# Patient Record
Sex: FEMALE | Race: WHITE | Employment: OTHER | URBAN - METROPOLITAN AREA
[De-identification: names, ages, dates, MRNs, and addresses within clinical notes are randomized per-mention and may not be internally consistent; named-entity substitution may affect disease eponyms.]

---

## 2017-01-05 RX ORDER — ATENOLOL 25 MG/1
TABLET ORAL
Qty: 90 TABLET | Refills: 1 | Status: SHIPPED | OUTPATIENT
Start: 2017-01-05 | End: 2017-06-21 | Stop reason: SDUPTHER

## 2017-02-21 RX ORDER — LOSARTAN POTASSIUM AND HYDROCHLOROTHIAZIDE 25; 100 MG/1; MG/1
TABLET ORAL
Qty: 90 TABLET | Refills: 1 | Status: SHIPPED | OUTPATIENT
Start: 2017-02-21 | End: 2017-08-03 | Stop reason: SDUPTHER

## 2017-03-10 ENCOUNTER — NURSE ONLY (OUTPATIENT)
Dept: FAMILY MEDICINE CLINIC | Age: 62
End: 2017-03-10

## 2017-03-10 DIAGNOSIS — S69.92XA HAND INJURY, LEFT, INITIAL ENCOUNTER: Primary | ICD-10-CM

## 2017-03-10 PROCEDURE — 90471 IMMUNIZATION ADMIN: CPT | Performed by: FAMILY MEDICINE

## 2017-03-10 PROCEDURE — 90715 TDAP VACCINE 7 YRS/> IM: CPT | Performed by: FAMILY MEDICINE

## 2017-04-24 ENCOUNTER — OFFICE VISIT (OUTPATIENT)
Dept: FAMILY MEDICINE CLINIC | Age: 62
End: 2017-04-24

## 2017-04-24 VITALS
OXYGEN SATURATION: 96 % | HEIGHT: 62 IN | HEART RATE: 59 BPM | TEMPERATURE: 98 F | BODY MASS INDEX: 30.07 KG/M2 | WEIGHT: 163.38 LBS | DIASTOLIC BLOOD PRESSURE: 74 MMHG | SYSTOLIC BLOOD PRESSURE: 122 MMHG

## 2017-04-24 DIAGNOSIS — R20.8 SKIN PAIN: ICD-10-CM

## 2017-04-24 DIAGNOSIS — B07.9 VIRAL WARTS, UNSPECIFIED TYPE: ICD-10-CM

## 2017-04-24 DIAGNOSIS — M79.10 MYALGIA: ICD-10-CM

## 2017-04-24 DIAGNOSIS — E78.00 PURE HYPERCHOLESTEROLEMIA: ICD-10-CM

## 2017-04-24 DIAGNOSIS — K21.9 GASTROESOPHAGEAL REFLUX DISEASE, ESOPHAGITIS PRESENCE NOT SPECIFIED: ICD-10-CM

## 2017-04-24 DIAGNOSIS — I10 ESSENTIAL HYPERTENSION: Primary | ICD-10-CM

## 2017-04-24 LAB
ALBUMIN SERPL-MCNC: 4.6 G/DL
ALP BLD-CCNC: 79 U/L
ALT SERPL-CCNC: 39 U/L
AST SERPL-CCNC: 33 U/L
BILIRUB SERPL-MCNC: 0.9 MG/DL (ref 0.1–1.4)
BUN BLDV-MCNC: 12 MG/DL
CALCIUM SERPL-MCNC: 10.4 MG/DL
CHLORIDE BLD-SCNC: 103 MMOL/L
CHOLESTEROL, TOTAL: 216 MG/DL
CHOLESTEROL/HDL RATIO: 5
CO2: 29 MMOL/L
CREAT SERPL-MCNC: 0.79 MG/DL
GFR CALCULATED: NORMAL
GLUCOSE BLD-MCNC: 104 MG/DL
HDLC SERPL-MCNC: 43 MG/DL (ref 35–70)
LDL CHOLESTEROL CALCULATED: 142 MG/DL (ref 0–160)
MAGNESIUM: 2.3 MG/DL
POTASSIUM SERPL-SCNC: 4.2 MMOL/L
SODIUM BLD-SCNC: 139 MMOL/L
TOTAL PROTEIN: 7.6
TRIGL SERPL-MCNC: 153 MG/DL
VITAMIN D 25-HYDROXY: 47
VITAMIN D2, 25 HYDROXY: NORMAL
VITAMIN D3,25 HYDROXY: NORMAL
VLDLC SERPL CALC-MCNC: NORMAL MG/DL

## 2017-04-24 PROCEDURE — 99214 OFFICE O/P EST MOD 30 MIN: CPT | Performed by: FAMILY MEDICINE

## 2017-04-24 PROCEDURE — 17110 DESTRUCTION B9 LES UP TO 14: CPT | Performed by: FAMILY MEDICINE

## 2017-04-24 RX ORDER — GREEN TEA/HOODIA GORDONII 315-12.5MG
CAPSULE ORAL
COMMUNITY

## 2017-04-24 RX ORDER — QUINIDINE SULFATE 200 MG
TABLET ORAL
COMMUNITY
End: 2021-11-08

## 2017-04-24 RX ORDER — OMEPRAZOLE 20 MG/1
20 CAPSULE, DELAYED RELEASE ORAL DAILY
Qty: 90 CAPSULE | Refills: 1 | Status: SHIPPED | OUTPATIENT
Start: 2017-04-24 | End: 2017-11-13 | Stop reason: SDUPTHER

## 2017-04-24 ASSESSMENT — PATIENT HEALTH QUESTIONNAIRE - PHQ9
SUM OF ALL RESPONSES TO PHQ9 QUESTIONS 1 & 2: 0
SUM OF ALL RESPONSES TO PHQ QUESTIONS 1-9: 0
2. FEELING DOWN, DEPRESSED OR HOPELESS: 0
1. LITTLE INTEREST OR PLEASURE IN DOING THINGS: 0

## 2017-04-25 LAB
CLIENT CONTACT:: NORMAL
REFERENCE LAB: NORMAL

## 2017-04-27 DIAGNOSIS — E78.00 PURE HYPERCHOLESTEROLEMIA: Primary | ICD-10-CM

## 2017-04-27 DIAGNOSIS — I10 ESSENTIAL HYPERTENSION: ICD-10-CM

## 2017-05-26 ENCOUNTER — OFFICE VISIT (OUTPATIENT)
Dept: FAMILY MEDICINE CLINIC | Age: 62
End: 2017-05-26

## 2017-05-26 VITALS
HEART RATE: 64 BPM | DIASTOLIC BLOOD PRESSURE: 82 MMHG | BODY MASS INDEX: 30.06 KG/M2 | OXYGEN SATURATION: 98 % | WEIGHT: 164.38 LBS | SYSTOLIC BLOOD PRESSURE: 131 MMHG | TEMPERATURE: 97.9 F

## 2017-05-26 DIAGNOSIS — J01.90 ACUTE BACTERIAL SINUSITIS: Primary | ICD-10-CM

## 2017-05-26 DIAGNOSIS — B96.89 ACUTE BACTERIAL SINUSITIS: Primary | ICD-10-CM

## 2017-05-26 PROCEDURE — 99214 OFFICE O/P EST MOD 30 MIN: CPT | Performed by: FAMILY MEDICINE

## 2017-05-26 RX ORDER — AMOXICILLIN 500 MG/1
1000 CAPSULE ORAL 2 TIMES DAILY
Qty: 40 CAPSULE | Refills: 0 | Status: SHIPPED | OUTPATIENT
Start: 2017-05-26 | End: 2017-06-05

## 2017-06-21 RX ORDER — ATENOLOL 25 MG/1
TABLET ORAL
Qty: 90 TABLET | Refills: 1 | Status: SHIPPED | OUTPATIENT
Start: 2017-06-21 | End: 2017-11-13 | Stop reason: SDUPTHER

## 2017-08-03 RX ORDER — LOSARTAN POTASSIUM AND HYDROCHLOROTHIAZIDE 25; 100 MG/1; MG/1
TABLET ORAL
Qty: 90 TABLET | Refills: 1 | Status: SHIPPED | OUTPATIENT
Start: 2017-08-03 | End: 2018-02-07 | Stop reason: SDUPTHER

## 2017-11-13 ENCOUNTER — OFFICE VISIT (OUTPATIENT)
Dept: FAMILY MEDICINE CLINIC | Age: 62
End: 2017-11-13

## 2017-11-13 VITALS
BODY MASS INDEX: 28.94 KG/M2 | WEIGHT: 157.25 LBS | HEIGHT: 62 IN | SYSTOLIC BLOOD PRESSURE: 125 MMHG | HEART RATE: 66 BPM | DIASTOLIC BLOOD PRESSURE: 83 MMHG

## 2017-11-13 DIAGNOSIS — I10 ESSENTIAL HYPERTENSION: ICD-10-CM

## 2017-11-13 DIAGNOSIS — Z11.59 NEED FOR HEPATITIS C SCREENING TEST: ICD-10-CM

## 2017-11-13 DIAGNOSIS — K21.9 GASTROESOPHAGEAL REFLUX DISEASE, ESOPHAGITIS PRESENCE NOT SPECIFIED: ICD-10-CM

## 2017-11-13 DIAGNOSIS — E78.00 PURE HYPERCHOLESTEROLEMIA: Primary | ICD-10-CM

## 2017-11-13 PROCEDURE — 99214 OFFICE O/P EST MOD 30 MIN: CPT | Performed by: FAMILY MEDICINE

## 2017-11-13 RX ORDER — OMEPRAZOLE 20 MG/1
20 CAPSULE, DELAYED RELEASE ORAL DAILY
Qty: 90 CAPSULE | Refills: 1 | Status: SHIPPED | OUTPATIENT
Start: 2017-11-13 | End: 2018-06-20 | Stop reason: SDUPTHER

## 2017-11-13 RX ORDER — ATENOLOL 25 MG/1
TABLET ORAL
Qty: 90 TABLET | Refills: 1 | Status: SHIPPED | OUTPATIENT
Start: 2017-11-13 | End: 2018-03-21

## 2017-11-13 NOTE — PROGRESS NOTES
is normal. Judgment and thought content normal.   Vitals reviewed. Assessment and Plan:  1. Pure hypercholesterolemia  Lipid Panel   2. Essential hypertension  atenolol (TENORMIN) 25 MG tablet    Basic Metabolic Panel   3. Gastroesophageal reflux disease, esophagitis presence not specified  omeprazole (PRILOSEC) 20 MG delayed release capsule   4. Need for hepatitis C screening test  Hepatitis C Antibody   She has had some upper respiratory symptoms but it is all clear drainage so most likely viral or allergy if this changes she will call. The problems listed in the assessment are stable unless otherwise indicated. She  was instructed to continue their current medications and treatment for the above  problems unless otherwise indicated above. Age-specific preventative medicine recommendations were reviewed with patient today and the Healthy Family Handout was given to patient. Avoid tobacco products exposure. Follow up in 6 mo. Call or return to office for any problems that develop before the next scheduled follow-up appointment. Chava Mathews M.D. Parts of this note were completed using voice recognition transcription. Every effort was made to ensure accuracy; however, inadvertent transcription errors may be present.

## 2017-11-15 LAB
BUN / CREAT RATIO: ABNORMAL (CALC) (ref 6–22)
BUN BLDV-MCNC: 12 MG/DL (ref 7–25)
CALCIUM SERPL-MCNC: 10.5 MG/DL
CHLORIDE BLD-SCNC: 102 MMOL/L (ref 98–110)
CHOLESTEROL, TOTAL: 236 MG/DL
CHOLESTEROL/HDL RATIO: 6.1 (CALC)
CHOLESTEROL: 197 MG/DL (CALC)
CO2: 26 MMOL/L (ref 20–31)
CREAT SERPL-MCNC: 0.83 MG/DL
GFR AFRICAN AMERICAN: ABNORMAL ML/MIN/1.73M2
GFR SERPL CREATININE-BSD FRML MDRD: ABNORMAL ML/MIN/1.73M2
GLUCOSE BLD-MCNC: 97 MG/DL (ref 65–99)
HDLC SERPL-MCNC: 39 MG/DL
HEPATITIS C ANTIBODY: NORMAL
HEPATITIS C VIRUS AB SIGNAL/CU: 0.03
LDL CHOLESTEROL CALCULATED: 160 MG/DL (CALC)
POTASSIUM SERPL-SCNC: 4.4 MMOL/L (ref 3.5–5.3)
SODIUM BLD-SCNC: 139 MMOL/L (ref 135–146)
TRIGL SERPL-MCNC: 210 MG/DL

## 2017-11-20 ENCOUNTER — OFFICE VISIT (OUTPATIENT)
Dept: FAMILY MEDICINE CLINIC | Age: 62
End: 2017-11-20

## 2017-11-20 VITALS
BODY MASS INDEX: 29.26 KG/M2 | HEART RATE: 84 BPM | WEIGHT: 160 LBS | SYSTOLIC BLOOD PRESSURE: 120 MMHG | DIASTOLIC BLOOD PRESSURE: 81 MMHG

## 2017-11-20 DIAGNOSIS — B02.9 HERPES ZOSTER WITHOUT COMPLICATION: Primary | ICD-10-CM

## 2017-11-20 LAB
CLIENT CONTACT:: NORMAL
REFERENCE LAB: NORMAL

## 2017-11-20 PROCEDURE — 99213 OFFICE O/P EST LOW 20 MIN: CPT | Performed by: FAMILY MEDICINE

## 2017-11-20 NOTE — PROGRESS NOTES
Subjective:   She presents for rash left upper chest some stinging hurting. She had shingles a few years back and then she got the vaccine after that he states it feels the same way as when she had shingles on her right flank        She is allergic to sudafed [pseudoephedrine hcl]. Objective:   /81 (Site: Left Arm, Position: Sitting, Cuff Size: Large Adult)   Pulse 84   Wt 160 lb (72.6 kg)   BMI 29.26 kg/m²   No results found for this visit on 11/20/17. Exam:  Resolving vesicular rash left upper chest  Assessment and Plan:  1. Herpes zoster without complication       Too late in the course to start on antiviral so just treat symptoms for now and monitor. Call or return to office prn if these symptoms worsen or fail to improve as anticipated. Avoid tobacco products exposure. The Healthy Family Handout was given to the patient today.   Chava Mathews M.D.

## 2017-12-05 ENCOUNTER — HOSPITAL ENCOUNTER (OUTPATIENT)
Dept: MAMMOGRAPHY | Age: 62
Discharge: HOME | End: 2017-12-05
Attending: OBSTETRICS & GYNECOLOGY | Admitting: OBSTETRICS & GYNECOLOGY

## 2017-12-05 DIAGNOSIS — Z12.31 ENCOUNTER FOR SCREENING MAMMOGRAM FOR BREAST CANCER: ICD-10-CM

## 2018-02-07 RX ORDER — LOSARTAN POTASSIUM AND HYDROCHLOROTHIAZIDE 25; 100 MG/1; MG/1
TABLET ORAL
Qty: 90 TABLET | Refills: 1 | Status: SHIPPED | OUTPATIENT
Start: 2018-02-07 | End: 2018-07-29 | Stop reason: SDUPTHER

## 2018-03-21 RX ORDER — ATENOLOL 25 MG/1
TABLET ORAL
Qty: 90 TABLET | Refills: 1 | Status: SHIPPED | OUTPATIENT
Start: 2018-03-21 | End: 2018-09-13 | Stop reason: SDUPTHER

## 2018-06-20 DIAGNOSIS — K21.9 GASTROESOPHAGEAL REFLUX DISEASE, ESOPHAGITIS PRESENCE NOT SPECIFIED: ICD-10-CM

## 2018-06-20 RX ORDER — OMEPRAZOLE 20 MG/1
CAPSULE, DELAYED RELEASE ORAL
Qty: 90 CAPSULE | Refills: 1 | Status: SHIPPED | OUTPATIENT
Start: 2018-06-20 | End: 2020-06-19 | Stop reason: SDUPTHER

## 2018-07-05 ENCOUNTER — OFFICE VISIT (OUTPATIENT)
Dept: FAMILY MEDICINE CLINIC | Age: 63
End: 2018-07-05

## 2018-07-05 VITALS
HEIGHT: 63 IN | SYSTOLIC BLOOD PRESSURE: 129 MMHG | DIASTOLIC BLOOD PRESSURE: 83 MMHG | BODY MASS INDEX: 29.38 KG/M2 | HEART RATE: 64 BPM | TEMPERATURE: 98.3 F | OXYGEN SATURATION: 97 % | WEIGHT: 165.8 LBS

## 2018-07-05 DIAGNOSIS — I10 ESSENTIAL HYPERTENSION: Primary | ICD-10-CM

## 2018-07-05 DIAGNOSIS — Z51.81 MEDICATION MONITORING ENCOUNTER: ICD-10-CM

## 2018-07-05 DIAGNOSIS — H11.31 SCLERAL HEMORRHAGE OF RIGHT EYE: ICD-10-CM

## 2018-07-05 DIAGNOSIS — E78.00 PURE HYPERCHOLESTEROLEMIA: ICD-10-CM

## 2018-07-05 DIAGNOSIS — B02.9 HERPES ZOSTER WITHOUT COMPLICATION: ICD-10-CM

## 2018-07-05 DIAGNOSIS — K21.9 GASTROESOPHAGEAL REFLUX DISEASE, ESOPHAGITIS PRESENCE NOT SPECIFIED: ICD-10-CM

## 2018-07-05 PROCEDURE — 99214 OFFICE O/P EST MOD 30 MIN: CPT | Performed by: FAMILY MEDICINE

## 2018-07-05 RX ORDER — BIMATOPROST 0.3 MG/ML
SOLUTION/ DROPS OPHTHALMIC
Qty: 3 BOTTLE | Refills: 1 | Status: SHIPPED | OUTPATIENT
Start: 2018-07-05 | End: 2019-01-21 | Stop reason: SDUPTHER

## 2018-07-05 RX ORDER — BIMATOPROST 0.3 MG/ML
SOLUTION/ DROPS OPHTHALMIC
Qty: 7.5 ML | Refills: 3 | Status: SHIPPED | OUTPATIENT
Start: 2018-07-05 | End: 2018-07-05 | Stop reason: SDUPTHER

## 2018-07-05 RX ORDER — ACYCLOVIR 800 MG/1
800 TABLET ORAL
Qty: 35 TABLET | Refills: 0 | Status: SHIPPED | OUTPATIENT
Start: 2018-07-05 | End: 2018-07-12

## 2018-07-05 RX ORDER — ACYCLOVIR 800 MG/1
TABLET ORAL
COMMUNITY
Start: 2018-07-04 | End: 2019-01-21 | Stop reason: ALTCHOICE

## 2018-07-05 ASSESSMENT — PATIENT HEALTH QUESTIONNAIRE - PHQ9
SUM OF ALL RESPONSES TO PHQ QUESTIONS 1-9: 0
SUM OF ALL RESPONSES TO PHQ9 QUESTIONS 1 & 2: 0
1. LITTLE INTEREST OR PLEASURE IN DOING THINGS: 0
2. FEELING DOWN, DEPRESSED OR HOPELESS: 0

## 2018-07-07 LAB
ALT SERPL-CCNC: 25 U/L
BUN / CREAT RATIO: NORMAL (CALC) (ref 6–22)
BUN BLDV-MCNC: 9 MG/DL (ref 7–25)
CALCIUM SERPL-MCNC: 9.9 MG/DL
CHLORIDE BLD-SCNC: 101 MMOL/L (ref 98–110)
CHOLESTEROL, TOTAL: 164 MG/DL
CHOLESTEROL/HDL RATIO: 4.4 (CALC)
CHOLESTEROL: 127 MG/DL (CALC)
CO2: 30 MMOL/L (ref 20–31)
CREAT SERPL-MCNC: 0.73 MG/DL
GFR AFRICAN AMERICAN: NORMAL ML/MIN/1.73M2
GFR SERPL CREATININE-BSD FRML MDRD: NORMAL ML/MIN/1.73M2
GLUCOSE BLD-MCNC: 92 MG/DL (ref 65–99)
HDLC SERPL-MCNC: 37 MG/DL
LDL CHOLESTEROL CALCULATED: 102 MG/DL (CALC)
POTASSIUM SERPL-SCNC: 3.9 MMOL/L (ref 3.5–5.3)
SODIUM BLD-SCNC: 138 MMOL/L (ref 135–146)
TRIGL SERPL-MCNC: 146 MG/DL

## 2018-07-30 RX ORDER — LOSARTAN POTASSIUM AND HYDROCHLOROTHIAZIDE 25; 100 MG/1; MG/1
TABLET ORAL
Qty: 90 TABLET | Refills: 1 | Status: SHIPPED | OUTPATIENT
Start: 2018-07-30 | End: 2019-01-22 | Stop reason: SDUPTHER

## 2018-09-13 RX ORDER — ATENOLOL 25 MG/1
TABLET ORAL
Qty: 90 TABLET | Refills: 1 | Status: SHIPPED | OUTPATIENT
Start: 2018-09-13 | End: 2019-03-05 | Stop reason: SDUPTHER

## 2019-01-21 ENCOUNTER — OFFICE VISIT (OUTPATIENT)
Dept: FAMILY MEDICINE CLINIC | Age: 64
End: 2019-01-21
Payer: COMMERCIAL

## 2019-01-21 VITALS
OXYGEN SATURATION: 96 % | TEMPERATURE: 98.1 F | HEART RATE: 62 BPM | BODY MASS INDEX: 29.05 KG/M2 | SYSTOLIC BLOOD PRESSURE: 124 MMHG | DIASTOLIC BLOOD PRESSURE: 82 MMHG | WEIGHT: 164 LBS

## 2019-01-21 DIAGNOSIS — I10 ESSENTIAL HYPERTENSION: Primary | ICD-10-CM

## 2019-01-21 DIAGNOSIS — E78.00 PURE HYPERCHOLESTEROLEMIA: ICD-10-CM

## 2019-01-21 DIAGNOSIS — K21.9 GASTROESOPHAGEAL REFLUX DISEASE, ESOPHAGITIS PRESENCE NOT SPECIFIED: ICD-10-CM

## 2019-01-21 DIAGNOSIS — Z12.39 BREAST CANCER SCREENING: ICD-10-CM

## 2019-01-21 PROCEDURE — 99214 OFFICE O/P EST MOD 30 MIN: CPT | Performed by: FAMILY MEDICINE

## 2019-01-21 RX ORDER — BIMATOPROST 0.3 MG/ML
SOLUTION/ DROPS OPHTHALMIC
Qty: 7.5 ML | Refills: 3 | Status: SHIPPED | OUTPATIENT
Start: 2019-01-21 | End: 2020-10-06 | Stop reason: DRUGHIGH

## 2019-01-22 RX ORDER — LOSARTAN POTASSIUM AND HYDROCHLOROTHIAZIDE 25; 100 MG/1; MG/1
TABLET ORAL
Qty: 90 TABLET | Refills: 1 | Status: SHIPPED | OUTPATIENT
Start: 2019-01-22 | End: 2019-07-20 | Stop reason: SDUPTHER

## 2019-01-23 LAB
CLIENT CONTACT:: NORMAL
REFERENCE LAB: NORMAL

## 2019-01-30 DIAGNOSIS — I10 ESSENTIAL HYPERTENSION: Primary | ICD-10-CM

## 2019-01-30 LAB
BUN BLDV-MCNC: 17 MG/DL
CALCIUM SERPL-MCNC: 9.6 MG/DL
CHLORIDE BLD-SCNC: 105 MMOL/L
CO2: 25 MMOL/L
CREAT SERPL-MCNC: 0.97 MG/DL
GFR CALCULATED: 63
GLUCOSE BLD-MCNC: 92 MG/DL
POTASSIUM SERPL-SCNC: 4.3 MMOL/L
SODIUM BLD-SCNC: 139 MMOL/L

## 2019-02-07 ENCOUNTER — OFFICE VISIT (OUTPATIENT)
Dept: FAMILY MEDICINE CLINIC | Age: 64
End: 2019-02-07
Payer: COMMERCIAL

## 2019-02-07 VITALS
SYSTOLIC BLOOD PRESSURE: 99 MMHG | OXYGEN SATURATION: 97 % | TEMPERATURE: 99.8 F | WEIGHT: 162 LBS | DIASTOLIC BLOOD PRESSURE: 63 MMHG | BODY MASS INDEX: 28.7 KG/M2 | HEART RATE: 86 BPM

## 2019-02-07 DIAGNOSIS — J01.40 ACUTE NON-RECURRENT PANSINUSITIS: Primary | ICD-10-CM

## 2019-02-07 PROCEDURE — 99213 OFFICE O/P EST LOW 20 MIN: CPT | Performed by: NURSE PRACTITIONER

## 2019-02-07 RX ORDER — DOXYCYCLINE HYCLATE 100 MG
100 TABLET ORAL 2 TIMES DAILY
Qty: 20 TABLET | Refills: 0 | Status: SHIPPED | OUTPATIENT
Start: 2019-02-07 | End: 2019-02-17

## 2019-02-07 ASSESSMENT — ENCOUNTER SYMPTOMS
FACIAL SWELLING: 1
COUGH: 1
SINUS PRESSURE: 1
SORE THROAT: 0
GASTROINTESTINAL NEGATIVE: 1
EYES NEGATIVE: 1
SINUS PAIN: 1
SHORTNESS OF BREATH: 0
WHEEZING: 0

## 2019-02-07 ASSESSMENT — PATIENT HEALTH QUESTIONNAIRE - PHQ9
SUM OF ALL RESPONSES TO PHQ QUESTIONS 1-9: 0
1. LITTLE INTEREST OR PLEASURE IN DOING THINGS: 0
SUM OF ALL RESPONSES TO PHQ QUESTIONS 1-9: 0
SUM OF ALL RESPONSES TO PHQ9 QUESTIONS 1 & 2: 0
2. FEELING DOWN, DEPRESSED OR HOPELESS: 0

## 2019-03-05 RX ORDER — ATENOLOL 25 MG/1
TABLET ORAL
Qty: 90 TABLET | Refills: 1 | Status: SHIPPED | OUTPATIENT
Start: 2019-03-05 | End: 2019-08-28 | Stop reason: SDUPTHER

## 2019-07-22 RX ORDER — LOSARTAN POTASSIUM AND HYDROCHLOROTHIAZIDE 25; 100 MG/1; MG/1
TABLET ORAL
Qty: 90 TABLET | Refills: 1 | Status: SHIPPED | OUTPATIENT
Start: 2019-07-22 | End: 2020-01-07

## 2019-08-05 ENCOUNTER — OFFICE VISIT (OUTPATIENT)
Dept: FAMILY MEDICINE CLINIC | Age: 64
End: 2019-08-05
Payer: COMMERCIAL

## 2019-08-05 VITALS
WEIGHT: 163 LBS | SYSTOLIC BLOOD PRESSURE: 113 MMHG | DIASTOLIC BLOOD PRESSURE: 77 MMHG | TEMPERATURE: 98.5 F | HEIGHT: 63 IN | OXYGEN SATURATION: 96 % | HEART RATE: 99 BPM | BODY MASS INDEX: 28.88 KG/M2

## 2019-08-05 DIAGNOSIS — Z51.81 MEDICATION MONITORING ENCOUNTER: ICD-10-CM

## 2019-08-05 DIAGNOSIS — I10 ESSENTIAL HYPERTENSION: Primary | ICD-10-CM

## 2019-08-05 DIAGNOSIS — E78.00 PURE HYPERCHOLESTEROLEMIA: ICD-10-CM

## 2019-08-05 DIAGNOSIS — K21.9 GASTROESOPHAGEAL REFLUX DISEASE, ESOPHAGITIS PRESENCE NOT SPECIFIED: ICD-10-CM

## 2019-08-05 DIAGNOSIS — K58.2 IRRITABLE BOWEL SYNDROME WITH BOTH CONSTIPATION AND DIARRHEA: ICD-10-CM

## 2019-08-05 LAB
ALT SERPL-CCNC: 48 U/L (ref 10–40)
ANION GAP SERPL CALCULATED.3IONS-SCNC: 15 MMOL/L (ref 3–16)
BUN BLDV-MCNC: 10 MG/DL (ref 7–20)
CALCIUM SERPL-MCNC: 10.1 MG/DL (ref 8.3–10.6)
CHLORIDE BLD-SCNC: 99 MMOL/L (ref 99–110)
CHOLESTEROL, TOTAL: 212 MG/DL (ref 0–199)
CO2: 26 MMOL/L (ref 21–32)
CREAT SERPL-MCNC: 0.7 MG/DL (ref 0.6–1.2)
GFR AFRICAN AMERICAN: >60
GFR NON-AFRICAN AMERICAN: >60
GLUCOSE BLD-MCNC: 98 MG/DL (ref 70–99)
HDLC SERPL-MCNC: 44 MG/DL (ref 40–60)
LDL CHOLESTEROL CALCULATED: 132 MG/DL
POTASSIUM SERPL-SCNC: 3.9 MMOL/L (ref 3.5–5.1)
SODIUM BLD-SCNC: 140 MMOL/L (ref 136–145)
TRIGL SERPL-MCNC: 178 MG/DL (ref 0–150)
VLDLC SERPL CALC-MCNC: 36 MG/DL

## 2019-08-05 PROCEDURE — 36415 COLL VENOUS BLD VENIPUNCTURE: CPT | Performed by: FAMILY MEDICINE

## 2019-08-05 PROCEDURE — 99214 OFFICE O/P EST MOD 30 MIN: CPT | Performed by: FAMILY MEDICINE

## 2019-08-05 RX ORDER — DICYCLOMINE HYDROCHLORIDE 10 MG/1
10 CAPSULE ORAL
Qty: 60 CAPSULE | Refills: 3 | Status: SHIPPED | OUTPATIENT
Start: 2019-08-05 | End: 2021-11-16 | Stop reason: SDUPTHER

## 2019-08-05 NOTE — PATIENT INSTRUCTIONS
Please read the healthy family handout that you were given and share it with your family. Please compare this printed medication list with your medications at home to be sure they are the same. If you have any medications that are different please contact us immediately at [phone]. Also review your allergies that we have listed, these may also include medications that you have not been able to tolerate, make sure everything listed is correct. If you have any allergies that are different please contact us immediately at [phone].

## 2019-08-05 NOTE — PROGRESS NOTES
Skin is warm. No cyanosis. Psychiatric: She has a normal mood and affect. Her behavior is normal. Judgment and thought content normal.   Vitals reviewed. Assessment and Plan:   Diagnosis Orders   1. Essential hypertension  Basic Metabolic Panel   2. Pure hypercholesterolemia  Lipid Panel   3. Gastroesophageal reflux disease, esophagitis presence not specified     4. Medication monitoring encounter  ALT   5. Irritable bowel syndrome with both constipation and diarrhea  dicyclomine (BENTYL) 10 MG capsule   try bentyl before going out to eat. The problems listed in the assessment are stable unless otherwise indicated. She  was instructed to continue their current medications and treatment for the aboveproblems unless otherwise indicated above. Age-specific preventative medicine recommendations were reviewed with patient today and the Healthy Family Handout was given to patient. Avoid tobacco products exposure. Follow up in 6mo. Call or return to office for any problems that develop before the next scheduled follow-up appointment. Megan Lopez M.D. Parts of this note were completed using voice recognition transcription. Every effort was made to ensure accuracy; however, inadvertent transcription errors may be present.

## 2019-08-29 RX ORDER — ATENOLOL 25 MG/1
TABLET ORAL
Qty: 90 TABLET | Refills: 1 | Status: SHIPPED | OUTPATIENT
Start: 2019-08-29 | End: 2020-02-26

## 2019-10-23 ENCOUNTER — HOSPITAL ENCOUNTER (OUTPATIENT)
Dept: MAMMOGRAPHY | Age: 64
Discharge: HOME | End: 2019-10-23
Payer: COMMERCIAL

## 2019-10-23 DIAGNOSIS — Z12.39 BREAST CANCER SCREENING: ICD-10-CM

## 2019-10-23 PROCEDURE — 77063 BREAST TOMOSYNTHESIS BI: CPT

## 2019-10-31 ENCOUNTER — TELEPHONE (OUTPATIENT)
Dept: FAMILY MEDICINE CLINIC | Age: 64
End: 2019-10-31

## 2019-11-05 ENCOUNTER — OFFICE VISIT (OUTPATIENT)
Dept: FAMILY MEDICINE CLINIC | Age: 64
End: 2019-11-05
Payer: COMMERCIAL

## 2019-11-05 VITALS
BODY MASS INDEX: 29.41 KG/M2 | SYSTOLIC BLOOD PRESSURE: 120 MMHG | OXYGEN SATURATION: 94 % | TEMPERATURE: 98.6 F | HEART RATE: 66 BPM | DIASTOLIC BLOOD PRESSURE: 79 MMHG | WEIGHT: 166 LBS

## 2019-11-05 DIAGNOSIS — M35.3 POLYMYALGIA (HCC): Primary | ICD-10-CM

## 2019-11-05 LAB
BASOPHILS ABSOLUTE: 0 K/UL (ref 0–0.2)
BASOPHILS RELATIVE PERCENT: 0.7 %
EOSINOPHILS ABSOLUTE: 0.1 K/UL (ref 0–0.6)
EOSINOPHILS RELATIVE PERCENT: 1.6 %
HCT VFR BLD CALC: 41.3 % (ref 36–48)
HEMOGLOBIN: 14 G/DL (ref 12–16)
LYMPHOCYTES ABSOLUTE: 2 K/UL (ref 1–5.1)
LYMPHOCYTES RELATIVE PERCENT: 32.7 %
MCH RBC QN AUTO: 29.7 PG (ref 26–34)
MCHC RBC AUTO-ENTMCNC: 33.9 G/DL (ref 31–36)
MCV RBC AUTO: 87.5 FL (ref 80–100)
MONOCYTES ABSOLUTE: 0.6 K/UL (ref 0–1.3)
MONOCYTES RELATIVE PERCENT: 9.3 %
NEUTROPHILS ABSOLUTE: 3.4 K/UL (ref 1.7–7.7)
NEUTROPHILS RELATIVE PERCENT: 55.7 %
PDW BLD-RTO: 13.3 % (ref 12.4–15.4)
PLATELET # BLD: 206 K/UL (ref 135–450)
PMV BLD AUTO: 9.4 FL (ref 5–10.5)
RBC # BLD: 4.72 M/UL (ref 4–5.2)
WBC # BLD: 6.1 K/UL (ref 4–11)

## 2019-11-05 PROCEDURE — 99214 OFFICE O/P EST MOD 30 MIN: CPT | Performed by: FAMILY MEDICINE

## 2019-11-05 PROCEDURE — 36415 COLL VENOUS BLD VENIPUNCTURE: CPT | Performed by: FAMILY MEDICINE

## 2019-11-06 LAB
A/G RATIO: 1.7 (ref 1.1–2.2)
ALBUMIN SERPL-MCNC: 4.8 G/DL (ref 3.4–5)
ALP BLD-CCNC: 71 U/L (ref 40–129)
ALT SERPL-CCNC: 53 U/L (ref 10–40)
ANION GAP SERPL CALCULATED.3IONS-SCNC: 17 MMOL/L (ref 3–16)
ANTI-NUCLEAR ANTIBODY (ANA): NEGATIVE
AST SERPL-CCNC: 37 U/L (ref 15–37)
BILIRUB SERPL-MCNC: 1 MG/DL (ref 0–1)
BUN BLDV-MCNC: 13 MG/DL (ref 7–20)
C-REACTIVE PROTEIN: 0.5 MG/L (ref 0–5.1)
CALCIUM SERPL-MCNC: 10.5 MG/DL (ref 8.3–10.6)
CHLORIDE BLD-SCNC: 98 MMOL/L (ref 99–110)
CO2: 24 MMOL/L (ref 21–32)
CREAT SERPL-MCNC: 0.7 MG/DL (ref 0.6–1.2)
GFR AFRICAN AMERICAN: >60
GFR NON-AFRICAN AMERICAN: >60
GLOBULIN: 2.8 G/DL
GLUCOSE BLD-MCNC: 96 MG/DL (ref 70–99)
POTASSIUM SERPL-SCNC: 4.4 MMOL/L (ref 3.5–5.1)
SEDIMENTATION RATE, ERYTHROCYTE: 14 MM/HR (ref 0–30)
SODIUM BLD-SCNC: 139 MMOL/L (ref 136–145)
TOTAL CK: 140 U/L (ref 26–192)
TOTAL PROTEIN: 7.6 G/DL (ref 6.4–8.2)
TSH SERPL DL<=0.05 MIU/L-ACNC: 3.38 UIU/ML (ref 0.27–4.2)
VITAMIN D 25-HYDROXY: 30.8 NG/ML

## 2019-11-07 ENCOUNTER — TELEPHONE (OUTPATIENT)
Dept: FAMILY MEDICINE CLINIC | Age: 64
End: 2019-11-07

## 2019-11-07 DIAGNOSIS — M35.3 POLYMYALGIA (HCC): Primary | ICD-10-CM

## 2019-11-07 DIAGNOSIS — M79.606 PAIN OF LOWER EXTREMITY, UNSPECIFIED LATERALITY: ICD-10-CM

## 2019-11-07 DIAGNOSIS — M54.50 ACUTE LOW BACK PAIN, UNSPECIFIED BACK PAIN LATERALITY, UNSPECIFIED WHETHER SCIATICA PRESENT: ICD-10-CM

## 2019-11-25 ENCOUNTER — HOSPITAL ENCOUNTER (OUTPATIENT)
Dept: PHYSICAL THERAPY | Age: 64
Setting detail: THERAPIES SERIES
Discharge: HOME | End: 2019-11-25
Payer: COMMERCIAL

## 2019-11-25 PROCEDURE — 97112 NEUROMUSCULAR REEDUCATION: CPT

## 2019-11-25 PROCEDURE — 97110 THERAPEUTIC EXERCISES: CPT

## 2019-11-25 PROCEDURE — 97161 PT EVAL LOW COMPLEX 20 MIN: CPT

## 2019-11-25 PROCEDURE — 97140 MANUAL THERAPY 1/> REGIONS: CPT

## 2019-11-26 ENCOUNTER — HOSPITAL ENCOUNTER (OUTPATIENT)
Dept: PHYSICAL THERAPY | Age: 64
Setting detail: THERAPIES SERIES
Discharge: HOME | End: 2019-11-26
Payer: COMMERCIAL

## 2019-11-26 PROCEDURE — 97112 NEUROMUSCULAR REEDUCATION: CPT | Performed by: PHYSICAL THERAPY ASSISTANT

## 2019-11-26 PROCEDURE — 97140 MANUAL THERAPY 1/> REGIONS: CPT | Performed by: PHYSICAL THERAPY ASSISTANT

## 2019-11-26 PROCEDURE — 97110 THERAPEUTIC EXERCISES: CPT | Performed by: PHYSICAL THERAPY ASSISTANT

## 2019-12-03 ENCOUNTER — HOSPITAL ENCOUNTER (OUTPATIENT)
Dept: PHYSICAL THERAPY | Age: 64
Setting detail: THERAPIES SERIES
Discharge: HOME | End: 2019-12-03
Payer: COMMERCIAL

## 2019-12-03 PROCEDURE — 97140 MANUAL THERAPY 1/> REGIONS: CPT | Performed by: PHYSICAL THERAPY ASSISTANT

## 2019-12-03 PROCEDURE — 97110 THERAPEUTIC EXERCISES: CPT | Performed by: PHYSICAL THERAPY ASSISTANT

## 2019-12-03 PROCEDURE — 97112 NEUROMUSCULAR REEDUCATION: CPT | Performed by: PHYSICAL THERAPY ASSISTANT

## 2019-12-05 ENCOUNTER — HOSPITAL ENCOUNTER (OUTPATIENT)
Dept: PHYSICAL THERAPY | Age: 64
Setting detail: THERAPIES SERIES
Discharge: HOME | End: 2019-12-05
Payer: COMMERCIAL

## 2019-12-05 PROCEDURE — 97140 MANUAL THERAPY 1/> REGIONS: CPT | Performed by: PHYSICAL THERAPY ASSISTANT

## 2019-12-05 PROCEDURE — 97112 NEUROMUSCULAR REEDUCATION: CPT | Performed by: PHYSICAL THERAPY ASSISTANT

## 2019-12-05 PROCEDURE — 97110 THERAPEUTIC EXERCISES: CPT | Performed by: PHYSICAL THERAPY ASSISTANT

## 2019-12-09 ENCOUNTER — HOSPITAL ENCOUNTER (OUTPATIENT)
Dept: PHYSICAL THERAPY | Age: 64
Setting detail: THERAPIES SERIES
Discharge: HOME | End: 2019-12-09
Payer: COMMERCIAL

## 2019-12-09 PROCEDURE — 97110 THERAPEUTIC EXERCISES: CPT | Performed by: PHYSICAL THERAPY ASSISTANT

## 2019-12-09 PROCEDURE — 97140 MANUAL THERAPY 1/> REGIONS: CPT | Performed by: PHYSICAL THERAPY ASSISTANT

## 2019-12-09 PROCEDURE — 97112 NEUROMUSCULAR REEDUCATION: CPT | Performed by: PHYSICAL THERAPY ASSISTANT

## 2019-12-11 ENCOUNTER — HOSPITAL ENCOUNTER (OUTPATIENT)
Dept: PHYSICAL THERAPY | Age: 64
Setting detail: THERAPIES SERIES
Discharge: HOME | End: 2019-12-11
Payer: COMMERCIAL

## 2019-12-11 PROCEDURE — 97112 NEUROMUSCULAR REEDUCATION: CPT | Performed by: PHYSICAL THERAPY ASSISTANT

## 2019-12-11 PROCEDURE — 97140 MANUAL THERAPY 1/> REGIONS: CPT | Performed by: PHYSICAL THERAPY ASSISTANT

## 2019-12-11 PROCEDURE — 97110 THERAPEUTIC EXERCISES: CPT | Performed by: PHYSICAL THERAPY ASSISTANT

## 2019-12-15 ENCOUNTER — HOSPITAL ENCOUNTER (EMERGENCY)
Age: 64
Discharge: HOME | End: 2019-12-15
Attending: EMERGENCY MEDICINE
Payer: COMMERCIAL

## 2019-12-15 ENCOUNTER — APPOINTMENT (OUTPATIENT)
Dept: CT IMAGING | Age: 64
End: 2019-12-15
Payer: COMMERCIAL

## 2019-12-15 VITALS
TEMPERATURE: 98.2 F | RESPIRATION RATE: 18 BRPM | DIASTOLIC BLOOD PRESSURE: 72 MMHG | SYSTOLIC BLOOD PRESSURE: 139 MMHG | WEIGHT: 160 LBS | OXYGEN SATURATION: 98 % | HEIGHT: 62 IN | BODY MASS INDEX: 29.44 KG/M2 | HEART RATE: 82 BPM

## 2019-12-15 DIAGNOSIS — R10.30 LOWER ABDOMINAL PAIN: ICD-10-CM

## 2019-12-15 DIAGNOSIS — K76.0 HEPATIC STEATOSIS: ICD-10-CM

## 2019-12-15 DIAGNOSIS — N75.0 INFECTED CYST OF BARTHOLIN'S GLAND DUCT: Primary | ICD-10-CM

## 2019-12-15 LAB
A/G RATIO: 1.5 (ref 1.1–2.2)
ALBUMIN SERPL-MCNC: 4.7 G/DL (ref 3.4–5)
ALP BLD-CCNC: 70 U/L (ref 40–129)
ALT SERPL-CCNC: 57 U/L (ref 10–40)
ANION GAP SERPL CALCULATED.3IONS-SCNC: 14 MMOL/L (ref 3–16)
AST SERPL-CCNC: 41 U/L (ref 15–37)
BASOPHILS ABSOLUTE: 0 K/UL (ref 0–0.2)
BASOPHILS RELATIVE PERCENT: 0.6 %
BILIRUB SERPL-MCNC: 0.9 MG/DL (ref 0–1)
BILIRUBIN URINE: NEGATIVE
BLOOD, URINE: NEGATIVE
BUN BLDV-MCNC: 11 MG/DL (ref 7–20)
CALCIUM SERPL-MCNC: 10 MG/DL (ref 8.3–10.6)
CHLORIDE BLD-SCNC: 97 MMOL/L (ref 99–110)
CLARITY: CLEAR
CO2: 26 MMOL/L (ref 21–32)
COLOR: YELLOW
CREAT SERPL-MCNC: 0.6 MG/DL (ref 0.6–1.2)
EOSINOPHILS ABSOLUTE: 0 K/UL (ref 0–0.6)
EOSINOPHILS RELATIVE PERCENT: 0.7 %
GFR AFRICAN AMERICAN: >60
GFR NON-AFRICAN AMERICAN: >60
GLOBULIN: 3.2 G/DL
GLUCOSE BLD-MCNC: 92 MG/DL (ref 70–99)
GLUCOSE URINE: NEGATIVE MG/DL
HCT VFR BLD CALC: 41.4 % (ref 36–48)
HEMOGLOBIN: 14.1 G/DL (ref 12–16)
KETONES, URINE: NEGATIVE MG/DL
LEUKOCYTE ESTERASE, URINE: NEGATIVE
LIPASE: 38 U/L (ref 13–60)
LYMPHOCYTES ABSOLUTE: 1.6 K/UL (ref 1–5.1)
LYMPHOCYTES RELATIVE PERCENT: 25.6 %
MCH RBC QN AUTO: 29.4 PG (ref 26–34)
MCHC RBC AUTO-ENTMCNC: 34.2 G/DL (ref 31–36)
MCV RBC AUTO: 86 FL (ref 80–100)
MICROSCOPIC EXAMINATION: NORMAL
MONOCYTES ABSOLUTE: 0.7 K/UL (ref 0–1.3)
MONOCYTES RELATIVE PERCENT: 11.5 %
NEUTROPHILS ABSOLUTE: 3.8 K/UL (ref 1.7–7.7)
NEUTROPHILS RELATIVE PERCENT: 61.6 %
NITRITE, URINE: NEGATIVE
PDW BLD-RTO: 13.4 % (ref 12.4–15.4)
PH UA: 6.5 (ref 5–8)
PLATELET # BLD: 224 K/UL (ref 135–450)
PMV BLD AUTO: 8.7 FL (ref 5–10.5)
POTASSIUM REFLEX MAGNESIUM: 3.7 MMOL/L (ref 3.5–5.1)
PROTEIN UA: NEGATIVE MG/DL
RBC # BLD: 4.81 M/UL (ref 4–5.2)
SODIUM BLD-SCNC: 137 MMOL/L (ref 136–145)
SPECIFIC GRAVITY UA: 1.01 (ref 1–1.03)
TOTAL PROTEIN: 7.9 G/DL (ref 6.4–8.2)
URINE REFLEX TO CULTURE: NORMAL
URINE TYPE: NORMAL
UROBILINOGEN, URINE: 0.2 E.U./DL
WBC # BLD: 6.2 K/UL (ref 4–11)

## 2019-12-15 PROCEDURE — 83690 ASSAY OF LIPASE: CPT

## 2019-12-15 PROCEDURE — 74177 CT ABD & PELVIS W/CONTRAST: CPT

## 2019-12-15 PROCEDURE — 2580000003 HC RX 258: Performed by: PHYSICIAN ASSISTANT

## 2019-12-15 PROCEDURE — 96361 HYDRATE IV INFUSION ADD-ON: CPT

## 2019-12-15 PROCEDURE — 36415 COLL VENOUS BLD VENIPUNCTURE: CPT

## 2019-12-15 PROCEDURE — 6360000004 HC RX CONTRAST MEDICATION: Performed by: PHYSICIAN ASSISTANT

## 2019-12-15 PROCEDURE — 99283 EMERGENCY DEPT VISIT LOW MDM: CPT

## 2019-12-15 PROCEDURE — 85025 COMPLETE CBC W/AUTO DIFF WBC: CPT

## 2019-12-15 PROCEDURE — 6360000002 HC RX W HCPCS: Performed by: PHYSICIAN ASSISTANT

## 2019-12-15 PROCEDURE — 6370000000 HC RX 637 (ALT 250 FOR IP): Performed by: PHYSICIAN ASSISTANT

## 2019-12-15 PROCEDURE — 80053 COMPREHEN METABOLIC PANEL: CPT

## 2019-12-15 PROCEDURE — 81003 URINALYSIS AUTO W/O SCOPE: CPT

## 2019-12-15 PROCEDURE — 96374 THER/PROPH/DIAG INJ IV PUSH: CPT

## 2019-12-15 PROCEDURE — 4500000023 HC ED LEVEL 3 PROCEDURE

## 2019-12-15 RX ORDER — SULFAMETHOXAZOLE AND TRIMETHOPRIM 800; 160 MG/1; MG/1
1 TABLET ORAL ONCE
Status: COMPLETED | OUTPATIENT
Start: 2019-12-15 | End: 2019-12-15

## 2019-12-15 RX ORDER — CEPHALEXIN 500 MG/1
500 CAPSULE ORAL ONCE
Status: COMPLETED | OUTPATIENT
Start: 2019-12-15 | End: 2019-12-15

## 2019-12-15 RX ORDER — KETOROLAC TROMETHAMINE 30 MG/ML
15 INJECTION, SOLUTION INTRAMUSCULAR; INTRAVENOUS ONCE
Status: COMPLETED | OUTPATIENT
Start: 2019-12-15 | End: 2019-12-15

## 2019-12-15 RX ORDER — CEPHALEXIN 500 MG/1
500 CAPSULE ORAL 4 TIMES DAILY
Qty: 28 CAPSULE | Refills: 0 | Status: SHIPPED | OUTPATIENT
Start: 2019-12-15 | End: 2019-12-22

## 2019-12-15 RX ORDER — SULFAMETHOXAZOLE AND TRIMETHOPRIM 800; 160 MG/1; MG/1
1 TABLET ORAL 2 TIMES DAILY
Qty: 14 TABLET | Refills: 0 | Status: SHIPPED | OUTPATIENT
Start: 2019-12-15 | End: 2019-12-22

## 2019-12-15 RX ORDER — ONDANSETRON 4 MG/1
4 TABLET, FILM COATED ORAL EVERY 8 HOURS PRN
Qty: 10 TABLET | Refills: 0 | Status: SHIPPED | OUTPATIENT
Start: 2019-12-15 | End: 2022-05-09 | Stop reason: ALTCHOICE

## 2019-12-15 RX ORDER — 0.9 % SODIUM CHLORIDE 0.9 %
1000 INTRAVENOUS SOLUTION INTRAVENOUS ONCE
Status: COMPLETED | OUTPATIENT
Start: 2019-12-15 | End: 2019-12-15

## 2019-12-15 RX ADMIN — SODIUM CHLORIDE 1000 ML: 900 INJECTION, SOLUTION INTRAVENOUS at 15:46

## 2019-12-15 RX ADMIN — KETOROLAC TROMETHAMINE 15 MG: 30 INJECTION, SOLUTION INTRAMUSCULAR at 15:46

## 2019-12-15 RX ADMIN — SULFAMETHOXAZOLE AND TRIMETHOPRIM 1 TABLET: 800; 160 TABLET ORAL at 17:17

## 2019-12-15 RX ADMIN — CEPHALEXIN 500 MG: 500 CAPSULE ORAL at 17:17

## 2019-12-15 RX ADMIN — IOPAMIDOL 75 ML: 755 INJECTION, SOLUTION INTRAVENOUS at 16:07

## 2019-12-15 ASSESSMENT — ENCOUNTER SYMPTOMS
ABDOMINAL PAIN: 1
VOMITING: 0
SHORTNESS OF BREATH: 0
DIARRHEA: 0
CONSTIPATION: 0

## 2019-12-15 ASSESSMENT — PAIN SCALES - GENERAL: PAINLEVEL_OUTOF10: 8

## 2019-12-16 ENCOUNTER — HOSPITAL ENCOUNTER (OUTPATIENT)
Dept: PHYSICAL THERAPY | Age: 64
Setting detail: THERAPIES SERIES
Discharge: HOME | End: 2019-12-16
Payer: COMMERCIAL

## 2019-12-18 ENCOUNTER — APPOINTMENT (OUTPATIENT)
Dept: PHYSICAL THERAPY | Age: 64
End: 2019-12-18
Payer: COMMERCIAL

## 2019-12-30 ENCOUNTER — HOSPITAL ENCOUNTER (OUTPATIENT)
Dept: PHYSICAL THERAPY | Age: 64
Setting detail: THERAPIES SERIES
Discharge: HOME | End: 2019-12-30
Payer: COMMERCIAL

## 2019-12-30 PROCEDURE — 97110 THERAPEUTIC EXERCISES: CPT | Performed by: PHYSICAL THERAPY ASSISTANT

## 2019-12-30 PROCEDURE — 97112 NEUROMUSCULAR REEDUCATION: CPT | Performed by: PHYSICAL THERAPY ASSISTANT

## 2019-12-30 PROCEDURE — 97140 MANUAL THERAPY 1/> REGIONS: CPT | Performed by: PHYSICAL THERAPY ASSISTANT

## 2020-01-02 ENCOUNTER — APPOINTMENT (OUTPATIENT)
Dept: PHYSICAL THERAPY | Age: 65
End: 2020-01-02
Payer: COMMERCIAL

## 2020-01-07 RX ORDER — LOSARTAN POTASSIUM AND HYDROCHLOROTHIAZIDE 25; 100 MG/1; MG/1
TABLET ORAL
Qty: 90 TABLET | Refills: 1 | Status: SHIPPED | OUTPATIENT
Start: 2020-01-07 | End: 2020-07-06

## 2020-01-09 ENCOUNTER — HOSPITAL ENCOUNTER (OUTPATIENT)
Dept: PHYSICAL THERAPY | Age: 65
Setting detail: THERAPIES SERIES
Discharge: HOME | End: 2020-01-09
Payer: COMMERCIAL

## 2020-01-09 PROCEDURE — 97140 MANUAL THERAPY 1/> REGIONS: CPT | Performed by: PHYSICAL THERAPY ASSISTANT

## 2020-01-09 PROCEDURE — 97112 NEUROMUSCULAR REEDUCATION: CPT | Performed by: PHYSICAL THERAPY ASSISTANT

## 2020-01-09 PROCEDURE — 97110 THERAPEUTIC EXERCISES: CPT | Performed by: PHYSICAL THERAPY ASSISTANT

## 2020-02-06 ENCOUNTER — HOSPITAL ENCOUNTER (OUTPATIENT)
Dept: PHYSICAL THERAPY | Age: 65
Setting detail: THERAPIES SERIES
Discharge: HOME | End: 2020-02-06
Payer: COMMERCIAL

## 2020-02-06 PROCEDURE — 97110 THERAPEUTIC EXERCISES: CPT | Performed by: PHYSICAL THERAPIST

## 2020-02-06 PROCEDURE — 97112 NEUROMUSCULAR REEDUCATION: CPT | Performed by: PHYSICAL THERAPIST

## 2020-02-06 PROCEDURE — 97140 MANUAL THERAPY 1/> REGIONS: CPT | Performed by: PHYSICAL THERAPIST

## 2020-02-06 NOTE — FLOWSHEET NOTE
formerly [facility], [address] [place] [address] [address], [id number]       Date: 2020          Patient Name; :  Helen Winston; 1955   Dx/ICD Code: Acute Low Back Pain, Polymyalgia, Pain of LE  Treatment Diagnosis: M54.5, M35.3, M79.662, M79.606      Physician: Eric Castellaon        Total PT Visits: 10     Measures Previous Current   Pain (0-10)  Back pain 2/10 \"soreness\"  Leg pain 3/10   Disability %  Mod Oswestry   = 12%   AROM  WNL             Strength     Hip flexion  4+   Hip abduction  4+     Specific Functional Improvements & Impressions:  States she hasn't had left legged symptoms since her last PT visit but is still getting Right sided symptoms down to her lateral knee. Plan & Recommendations:  [x] Continue rehabilitation due to objective improvement and continued functional deficits with frequency and duration: 2x week/ 12 weeks  [] Progress toward  []GAP, []Work Conditioning, []Independent HEP   [x] Discharge due to   [x] Most goals achieved, [] Maximized \"medical necessity\" [] No subjective or objective improvements      Electronically signed by:  Shimon Buckley, [address] [employer] Re-Certification  This patient has been re-evaluated for physical therapy services and for therapy to continue, Medicare, Medicaid and other insurances require periodic physician review of the treatment plan. Please review the above re-evaluation and verify that you agree with plan of care as established above by signing the attached document and return it to our office or note changes to established plan below  [] Follow treatment plan as above [] Discontinue physical therapy  [] Change plan to:                                 __________________________________________________    Physician Signature:____________________________________ Date:____________  By signing above, therapists plan is approved by physician    If you have any questions or concerns, please don't hesitate to call.   Thank you for your referral.    [facility] [address] office  ([phone])     Fax [phone]     Physical Therapy Treatment Note/ Progress Report:     Date:  2020    Patient Name:  Marily Bcekford    :  1955  MRN: [id number]  Restrictions/Precautions:    Medical/Treatment Diagnosis Information:  · Diagnosis:  Acute Low Back Pain, Polymyalgia, Pain of Right leg  · Treatment Diagnosis: M54.5, M35.3, R86.168  Insurance/Certification information:  PT Insurance Information: [employer]  Physician Information:  Referring Practitioner: Susannah Quiroz  Has the plan of care been signed (Y/N):        []  Yes  [x]  No     Date of Patient follow up with Physician:     Is this a Progress Report:     [x]  Yes  []  No      If Yes:  Date Range for reporting period:  Initial Eval: 19    Beginnin20 ----- Ending: 3/6/20    Progress report will be due (10 Rx or 30 days whichever is less): 22    Recertification will be due (POC Duration  / 90 days whichever is less): 20    Visit # Insurance Allowable Auth Required   2019 90      90 []  Yes []  No      Functional Scale: Mod Oswestry: 10%   Date assessed:  19  Functional Scale: Mod Oswestry 7/50 = 14%  Date assessed: 2020  Functional Scale: Mod Oswestry 6/50 = 12%  Date assessed: 2020       Latex Allergy:  [x]NO      []YES  Preferred Language for Healthcare:   [x]English       []other:    Pain level: 3/10      SUBJECTIVE: See progress note above. OBJECTIVE: See eval   Observation:   Right leg showing a leg length discrepancy (right shorter).  2020    Test measurements:      RESTRICTIONS/PRECAUTIONS: ** Right leg radiculopathy  Exercises/Interventions:   Therapeutic Ex (21328)  Therapeutic Activity (34669)  NMR re-education (62917) Sets/Reps Notes/CUES   Bike                         Supine ADD squeeze 10x10\"    Supine abd  x20 Purple clams   Bridge x20    Hook lying crunches x20    SL clams x20    SL SLR x20 Manual Intervention (53246)     Intermittent Manual Traction  5 min Relief with single long leg distraction   Deric stretch 3x30\"    IT band stretch 3x30\"         HS and Piriformis stretch          MET (left leg longer than right) - performed shot gun technique  5 min                   Patient Education 5' Pt ed with HEP and progress of PT tx       Therapeutic Exercise and NMR EXR  [x] (44097) Provided verbal/tactile cueing for activities related to strengthening, flexibility, endurance, ROM for improvements in LE, proximal hip, and core control with self care, mobility, lifting, ambulation. [x] (40769) Provided verbal/tactile cueing for activities related to improving balance, coordination, kinesthetic sense, posture, motor skill, proprioception to assist with LE, proximal hip, and core control in self-care, mobility, lifting, ambulation and eccentric single leg control. NMR and Therapeutic Activities:    [x] (05042 or 21430) Provided verbal/tactile cueing for activities related to improving balance, coordination, kinesthetic sense, posture, motor skill, proprioception and motor activation to allow for proper function of core, proximal hip and LE with self-care and ADLs and functional mobility.    [x] (96973) Gait Re-education- Provided training and instruction to the patient for proper LE, core and proximal hip recruitment and positioning and eccentric body weight control with ambulation re-education including up and down stairs     Home Exercise Program:    [x] (87340) Reviewed/Progressed HEP activities related to strengthening, flexibility, endurance, ROM of core, proximal hip and LE for functional self-care, mobility, lifting and ambulation/stair navigation   [x] (23340) Reviewed/Progressed HEP activities related to improving balance, coordination, kinesthetic sense, posture, motor skill, proprioception of core, proximal hip and LE for self-care, mobility, lifting, and ambulation/stair navigation      Manual Treatments:  PROM / STM / Oscillations-Mobs:  G-I, II, III, IV (PA's, Inf., Post.)  [x] (26063) Provided manual therapy to mobilize LE, proximal hip and/or LS spine soft tissue/joints for the purpose of modulating pain, promoting relaxation, increasing ROM, reducing/eliminating soft tissue swelling/inflammation/restriction, improving soft tissue extensibility and allowing for proper ROM for normal function with self-care, mobility, lifting and ambulation. Modalities:      Charges:  Timed Code Treatment Minutes: 54'   Total Treatment Minutes:  54'   [facility]:  [facility] TIME:  MANUAL TIME:  UNTIMED MINUTES:   -  -  -  -      [] EVAL (LOW) 02746 (typically 20 minutes face-to-face)  [] EVAL (MOD) 06446 (typically 30 minutes face-to-face)  [] EVAL (HIGH) 56942 (typically 45 minutes face-to-face)  [] RE-EVAL     [x] PX(82573) x   2  [] IONTO  [x] NMR (07393) x   1  [] VASO  [x] Manual (21736) x    1 [] Other:  [] TA x      [] Mech Traction (04170)  [] ES(attended) (00194)      [] ES (un) (65731):    ASSESSMENT:  See eval    GOALS:   Short Term Goals: To be achieved in: 2 weeks  1. Independent in HEP and progression per patient tolerance, in order to prevent re-injury. [] Progressing: [x] Met: [] Not Met: [] Adjusted   2. Patient will have a decrease in pain to facilitate improvement in movement, function, and ADLs as indicated by Functional Deficits. [] Progressing: [x] Met: [] Not Met: [] Adjusted     Long Term Goals: To be achieved in: 12 weeks  1. Disability index score of 10% or less for the Mod Oswestry to assist with reaching prior level of function. [x] Progressing: [] Met: [] Not Met: [] Adjusted   2. Patient will demonstrate increased AROM to equal the opposite side bilaterally to allow for proper joint functioning as indicated by patients Functional Deficits. [] Progressing: [x] Met: [] Not Met: [] Adjusted   3.  Patient will demonstrate an increase in strength to of B LE to 4+/5 grossly to allow for proper functional mobility as indicated by patients Functional Deficits. [] Progressing: [x] Met: [] Not Met: [] Adjusted   4. Patient will return to all transfers, work activities, and functional activities without increased symptoms or restriction. [] Progressing: [x] Met: [] Not Met: [] Adjusted   5. Patient will have 0/10 pain with ADL's.  [] Progressing: [x] Met: [] Not Met: [] Adjusted   6. Patient stated goal: to be able to sleep without interruption during sleep  [x] Progressing: [] Met: [] Not Met: [] Adjusted     Overall Progression Towards Functional goals/ Treatment Progress Update:  [x] Patient is progressing as expected towards functional goals listed. [] Progression is slowed due to complexities/Impairments listed. [] Progression has been slowed due to co-morbidities.   [] Plan just implemented, too soon to assess goals progression <30days   [] Goals require adjustment due to lack of progress  [] Patient is not progressing as expected and requires additional follow up with physician  [] Other    Prognosis for POC: [x] Good [] Fair  [] Poor      Patient requires continued skilled intervention: [x] Yes  [] No    Treatment/Activity Tolerance:  [x] Patient able to complete treatment  [] Patient limited by fatigue  [] Patient limited by pain    [] Patient limited by other medical complications  [] Other:     Return to Play: (if applicable)   []  Stage 1: Intro to Strength   []  Stage 2: Return to Run and Strength   []  Stage 3: Return to Jump and Strength   []  Stage 4: Dynamic Strength and Agility   []  Stage 5: Sport Specific Training     []  Ready to Return to Play, Meets All Above Stages   []  Not Ready for Return to Sports   Comments:                         PLAN: DC  [] Continue per plan of care [] Alter current plan (see comments above)  [] Plan of care initiated [] Hold pending MD visit [x] Discharge    Electronically signed by:

## 2020-02-11 ENCOUNTER — HOSPITAL ENCOUNTER (OUTPATIENT)
Dept: PHYSICAL THERAPY | Age: 65
Setting detail: THERAPIES SERIES
Discharge: HOME | End: 2020-02-11
Payer: COMMERCIAL

## 2020-02-11 PROCEDURE — 97112 NEUROMUSCULAR REEDUCATION: CPT | Performed by: PHYSICAL THERAPY ASSISTANT

## 2020-02-11 PROCEDURE — 97110 THERAPEUTIC EXERCISES: CPT | Performed by: PHYSICAL THERAPY ASSISTANT

## 2020-02-11 PROCEDURE — 97140 MANUAL THERAPY 1/> REGIONS: CPT | Performed by: PHYSICAL THERAPY ASSISTANT

## 2020-02-11 NOTE — FLOWSHEET NOTE
Hip ABD with ab set  2x15 R, L ea                   Supine ADD squeeze 10x10\"    Supine abd  x20 Purple clams   PPT 5\" x20    Lumbar Rotations  5\" x20 R, L     Bridge x20    Hook lying crunches x20    SL clams x20     SLR 2x15    Supine Marches  x20                   Supine HS Stretch  3x30\"    Piriformis stretch  3x30\"                             HEP Code  [id number]                           Manual Intervention (00766)     Intermittent Manual Traction  5 min Relief with single long leg distraction   Deric stretch 3x30\"    IT band stretch 3x30\"         HS and Piriformis stretch          MET (left leg longer than right) - performed shot gun technique  5 min                   Patient Education 5' Pt ed with HEP and progress of PT tx       Therapeutic Exercise and NMR EXR  [x] (40747) Provided verbal/tactile cueing for activities related to strengthening, flexibility, endurance, ROM for improvements in LE, proximal hip, and core control with self care, mobility, lifting, ambulation. [x] (93350) Provided verbal/tactile cueing for activities related to improving balance, coordination, kinesthetic sense, posture, motor skill, proprioception to assist with LE, proximal hip, and core control in self-care, mobility, lifting, ambulation and eccentric single leg control. NMR and Therapeutic Activities:    [x] (82669 or 55077) Provided verbal/tactile cueing for activities related to improving balance, coordination, kinesthetic sense, posture, motor skill, proprioception and motor activation to allow for proper function of core, proximal hip and LE with self-care and ADLs and functional mobility.    [x] (01175) Gait Re-education- Provided training and instruction to the patient for proper LE, core and proximal hip recruitment and positioning and eccentric body weight control with ambulation re-education including up and down stairs     Home Exercise Program:    [x] (32396) Reviewed/Progressed HEP activities related to strengthening, flexibility, endurance, ROM of core, proximal hip and LE for functional self-care, mobility, lifting and ambulation/stair navigation   [x] (06968) Reviewed/Progressed HEP activities related to improving balance, coordination, kinesthetic sense, posture, motor skill, proprioception of core, proximal hip and LE for self-care, mobility, lifting, and ambulation/stair navigation      Manual Treatments:  PROM / STM / Oscillations-Mobs:  G-I, II, III, IV (PA's, Inf., Post.)  [x] (68885) Provided manual therapy to mobilize LE, proximal hip and/or LS spine soft tissue/joints for the purpose of modulating pain, promoting relaxation, increasing ROM, reducing/eliminating soft tissue swelling/inflammation/restriction, improving soft tissue extensibility and allowing for proper ROM for normal function with self-care, mobility, lifting and ambulation. Modalities:      Charges:  Timed Code Treatment Minutes: 54'   Total Treatment Minutes:  54'   [facility]:  [facility] TIME:  MANUAL TIME:  UNTIMED MINUTES:   -  -  -  -      [] EVAL (LOW) 76260 (typically 20 minutes face-to-face)  [] EVAL (MOD) 51841 (typically 30 minutes face-to-face)  [] EVAL (HIGH) 52544 (typically 45 minutes face-to-face)  [] RE-EVAL     [x] NQ(44024) x   2  [] IONTO  [x] NMR (84218) x   1  [] VASO  [x] Manual (83447) x    1 [] Other:  [] TA x      [] Mech Traction (16000)  [] ES(attended) (90274)      [] ES (un) (74857):    ASSESSMENT:  See eval    GOALS:   Short Term Goals: To be achieved in: 2 weeks  1. Independent in HEP and progression per patient tolerance, in order to prevent re-injury. [] Progressing: [x] Met: [] Not Met: [] Adjusted   2. Patient will have a decrease in pain to facilitate improvement in movement, function, and ADLs as indicated by Functional Deficits. [] Progressing: [x] Met: [] Not Met: [] Adjusted     Long Term Goals: To be achieved in: 12 weeks  1.  Disability index score of 10% or less for the Mod Oswestry to assist Stage 5: Sport Specific Training     []  Ready to Return to Play, Meets All Above Stages   []  Not Ready for Return to Sports   Comments:                         PLAN:   [x] Continue per plan of care [] Alter current plan (see comments above)  [] Plan of care initiated [] Hold pending MD visit [] Discharge    Electronically signed by:  Manuel Hugo PTA   Note: If patient does not return for scheduled/ recommended follow up visits, this note will serve as a discharge from care along with most recent update on progress.

## 2020-02-12 ENCOUNTER — HOSPITAL ENCOUNTER (OUTPATIENT)
Dept: PHYSICAL THERAPY | Age: 65
Setting detail: THERAPIES SERIES
End: 2020-02-12
Payer: COMMERCIAL

## 2020-02-13 ENCOUNTER — HOSPITAL ENCOUNTER (OUTPATIENT)
Dept: PHYSICAL THERAPY | Age: 65
Setting detail: THERAPIES SERIES
Discharge: HOME | End: 2020-02-13
Payer: COMMERCIAL

## 2020-02-13 PROCEDURE — 97112 NEUROMUSCULAR REEDUCATION: CPT | Performed by: PHYSICAL THERAPIST

## 2020-02-13 PROCEDURE — 97140 MANUAL THERAPY 1/> REGIONS: CPT | Performed by: PHYSICAL THERAPIST

## 2020-02-13 PROCEDURE — 97110 THERAPEUTIC EXERCISES: CPT | Performed by: PHYSICAL THERAPIST

## 2020-02-13 NOTE — FLOWSHEET NOTE
[facility], [address] [place] [address] [address], [facility] Therapy office  ([phone])     Fax [phone]     Physical Therapy Treatment Note/ Progress Report:     Date:  2020    Patient Name:  Priyanka Dobbins    :  1955  MRN: [id number]  Restrictions/Precautions:    Medical/Treatment Diagnosis Information:  · Diagnosis:  Acute Low Back Pain, Polymyalgia, Pain of Right leg  · Treatment Diagnosis: M54.5, M35.3, O56.072  Insurance/Certification information:  PT Insurance Information: Aetna  Physician Information:  Referring Practitioner: Pat Lara  Has the plan of care been signed (Y/N):        []  Yes  [x]  No     Date of Patient follow up with Physician:     Is this a Progress Report:     [x]  Yes  []  No      If Yes:  Date Range for reporting period:  Initial Eval: 19    Beginnin20 ----- Ending: 3/6/20    Progress report will be due (10 Rx or 30 days whichever is less): 06    Recertification will be due (POC Duration  / 90 days whichever is less): 20    Visit # Insurance Allowable Auth Required   3-2020      8 - 2019 90      90 []  Yes []  No      Functional Scale: Mod Oswestry: 10%   Date assessed:  19  Functional Scale: Mod Oswestry 7/50 = 14%  Date assessed: 2020  Functional Scale: Mod Oswestry 6/50 = 12%  Date assessed: 2020       Latex Allergy:  [x]NO      []YES  Preferred Language for Healthcare:   [x]English       []other:    Pain level: 2/10      SUBJECTIVE:   States she's been having some good/bad days lately with intermittent pain down her right leg down to her knee. OBJECTIVE: See eval   Observation:   Right leg showing a leg length discrepancy (right shorter).  2020    Test measurements:      RESTRICTIONS/PRECAUTIONS: ** Right leg radiculopathy  Exercises/Interventions:   Therapeutic Ex (76035)  Therapeutic Activity (17434)  NMR re-education (80574) Sets/Reps Notes/CUES   Bike 5' L5         HR/ Slant Board  X30 Program:    [x] (26479) Reviewed/Progressed HEP activities related to strengthening, flexibility, endurance, ROM of core, proximal hip and LE for functional self-care, mobility, lifting and ambulation/stair navigation   [x] (35489) Reviewed/Progressed HEP activities related to improving balance, coordination, kinesthetic sense, posture, motor skill, proprioception of core, proximal hip and LE for self-care, mobility, lifting, and ambulation/stair navigation      Manual Treatments:  PROM / STM / Oscillations-Mobs:  G-I, II, III, IV (PA's, Inf., Post.)  [x] (63606) Provided manual therapy to mobilize LE, proximal hip and/or LS spine soft tissue/joints for the purpose of modulating pain, promoting relaxation, increasing ROM, reducing/eliminating soft tissue swelling/inflammation/restriction, improving soft tissue extensibility and allowing for proper ROM for normal function with self-care, mobility, lifting and ambulation. Modalities:      Charges:  Timed Code Treatment Minutes: 54'   Total Treatment Minutes:  54'   [facility]:  [facility] TIME:  MANUAL TIME:  UNTIMED MINUTES:   -  -  -  -      [] EVAL (LOW) 78471 (typically 20 minutes face-to-face)  [] EVAL (MOD) 17639 (typically 30 minutes face-to-face)  [] EVAL (HIGH) 03301 (typically 45 minutes face-to-face)  [] RE-EVAL     [x] KY(71085) x   2  [] IONTO  [x] NMR (75788) x   1  [] VASO  [x] Manual (34136) x    1 [] Other:  [] TA x      [] [facility] (59068)  [] ES(attended) (84742)      [] ES (un) (76749):    ASSESSMENT:  See eval    GOALS:   Short Term Goals: To be achieved in: 2 weeks  1. Independent in HEP and progression per patient tolerance, in order to prevent re-injury. [] Progressing: [x] Met: [] Not Met: [] Adjusted   2. Patient will have a decrease in pain to facilitate improvement in movement, function, and ADLs as indicated by Functional Deficits. [] Progressing: [x] Met: [] Not Met: [] Adjusted     Long Term Goals: To be achieved in: 12 weeks  1. Disability index score of 10% or less for the Mod Oswestry to assist with reaching prior level of function. [x] Progressing: [] Met: [] Not Met: [] Adjusted   2. Patient will demonstrate increased AROM to equal the opposite side bilaterally to allow for proper joint functioning as indicated by patients Functional Deficits. [] Progressing: [x] Met: [] Not Met: [] Adjusted   3. Patient will demonstrate an increase in strength to of B LE to 4+/5 grossly to allow for proper functional mobility as indicated by patients Functional Deficits. [] Progressing: [x] Met: [] Not Met: [] Adjusted   4. Patient will return to all transfers, work activities, and functional activities without increased symptoms or restriction. [] Progressing: [x] Met: [] Not Met: [] Adjusted   5. Patient will have 0/10 pain with ADL's.  [] Progressing: [x] Met: [] Not Met: [] Adjusted   6. Patient stated goal: to be able to sleep without interruption during sleep  [x] Progressing: [] Met: [] Not Met: [] Adjusted     Overall Progression Towards Functional goals/ Treatment Progress Update:  [x] Patient is progressing as expected towards functional goals listed. [] Progression is slowed due to complexities/Impairments listed. [] Progression has been slowed due to co-morbidities.   [] Plan just implemented, too soon to assess goals progression <30days   [] Goals require adjustment due to lack of progress  [] Patient is not progressing as expected and requires additional follow up with physician  [] Other    Prognosis for POC: [x] Good [] Fair  [] Poor      Patient requires continued skilled intervention: [x] Yes  [] No    Treatment/Activity Tolerance:  [x] Patient able to complete treatment  [] Patient limited by fatigue  [] Patient limited by pain    [] Patient limited by other medical complications  [] Other:     Return to Play: (if applicable)   []  Stage 1: Intro to Strength   []  Stage 2: Return to Run and Strength   []  Stage 3: Return to Jump and Strength   []  Stage 4: Dynamic Strength and Agility   []  Stage 5: Sport Specific Training     []  Ready to Return to Play, Meets All Above Stages   []  Not Ready for Return to Sports   Comments:                         PLAN:   [x] Continue per plan of care [] Alter current plan (see comments above)  [] Plan of care initiated [] Hold pending MD visit [] Discharge    Electronically signed by:  Amy Madden PT   Note: If patient does not return for scheduled/ recommended follow up visits, this note will serve as a discharge from care along with most recent update on progress.

## 2020-02-18 ENCOUNTER — HOSPITAL ENCOUNTER (OUTPATIENT)
Dept: PHYSICAL THERAPY | Age: 65
Setting detail: THERAPIES SERIES
Discharge: HOME | End: 2020-02-18
Payer: COMMERCIAL

## 2020-02-18 PROCEDURE — 97140 MANUAL THERAPY 1/> REGIONS: CPT | Performed by: PHYSICAL THERAPY ASSISTANT

## 2020-02-18 PROCEDURE — 97110 THERAPEUTIC EXERCISES: CPT | Performed by: PHYSICAL THERAPY ASSISTANT

## 2020-02-18 PROCEDURE — 97112 NEUROMUSCULAR REEDUCATION: CPT | Performed by: PHYSICAL THERAPY ASSISTANT

## 2020-02-18 NOTE — FLOWSHEET NOTE
[facility],  [facility] [address], [address]         Physical Therapy Treatment Note/ Progress Report:     Date:  2020    Patient Name:  Raul Yen    :  1955  MRN: [id number]  Restrictions/Precautions:    Medical/Treatment Diagnosis Information:  · Diagnosis:  Acute Low Back Pain, Polymyalgia, Pain of Right leg  · Treatment Diagnosis: M54.5, M35.3, M88.038  Insurance/Certification information:  PT Insurance Information: Aetna  Physician Information:  Referring Practitioner: Mansi Goddard  Has the plan of care been signed (Y/N):        []  Yes  [x]  No     Date of Patient follow up with Physician:     Is this a Progress Report:     []  Yes  [x]  No      If Yes:  Date Range for reporting period:  Initial Eval: 19    Beginnin20 ----- Ending: 3/6/20    Progress report will be due (10 Rx or 30 days whichever is less): 74    Recertification will be due (POC Duration  / 90 days whichever is less): 20    Visit # Insurance Allowable Auth Required   2019 90      90 []  Yes []  No      Functional Scale: Mod Oswestry: 10%   Date assessed:  19  Functional Scale: Mod Oswestry 7/50 = 14%  Date assessed: 2020  Functional Scale: Mod Oswestry 6/50 = 12%  Date assessed: 2020       Latex Allergy:  [x]NO      []YES  Preferred Language for Healthcare:   [x]English       []other:    Pain level: 2/10      SUBJECTIVE:  Still having trouble sleeping on my right side and having a lot of ache in my right side/belt line. OBJECTIVE: See eval   Observation:   Right leg showing a leg length discrepancy (right shorter).  2020    Test measurements:      RESTRICTIONS/PRECAUTIONS: ** Right leg radiculopathy  Exercises/Interventions:   Therapeutic Ex (69331)  Therapeutic Activity (92106)  NMR re-education (14594) Sets/Reps Notes/CUES   Bike 5' L5         HR/ Slant Board  X30 / 3x30\"    Erlinda Starch with ab set  2x15    HS Curl / Hip ABD with ab set  2x15 R, L related to strengthening, flexibility, endurance, ROM of core, proximal hip and LE for functional self-care, mobility, lifting and ambulation/stair navigation   [x] (20287) Reviewed/Progressed HEP activities related to improving balance, coordination, kinesthetic sense, posture, motor skill, proprioception of core, proximal hip and LE for self-care, mobility, lifting, and ambulation/stair navigation      Manual Treatments:  PROM / STM / Oscillations-Mobs:  G-I, II, III, IV (PA's, Inf., Post.)  [x] (61746) Provided manual therapy to mobilize LE, proximal hip and/or LS spine soft tissue/joints for the purpose of modulating pain, promoting relaxation, increasing ROM, reducing/eliminating soft tissue swelling/inflammation/restriction, improving soft tissue extensibility and allowing for proper ROM for normal function with self-care, mobility, lifting and ambulation. Modalities:      Charges:  Timed Code Treatment Minutes: 54'   Total Treatment Minutes:  54'   150 [facility]:  [facility] TIME:  MANUAL TIME:  UNTIMED MINUTES:   -  -  -  -      [] EVAL (LOW) 40767 (typically 20 minutes face-to-face)  [] EVAL (MOD) 97166 (typically 30 minutes face-to-face)  [] EVAL (HIGH) 77996 (typically 45 minutes face-to-face)  [] RE-EVAL     [x] JK(15755) x   2  [] IONTO  [x] NMR (89894) x   1  [] VASO  [x] Manual (93709) x    1 [] Other:  [] TA x      [] Mech Traction (89374)  [] ES(attended) (74686)      [] ES (un) (73669):    ASSESSMENT:  See eval    GOALS:   Short Term Goals: To be achieved in: 2 weeks  1. Independent in HEP and progression per patient tolerance, in order to prevent re-injury. [] Progressing: [x] Met: [] Not Met: [] Adjusted   2. Patient will have a decrease in pain to facilitate improvement in movement, function, and ADLs as indicated by Functional Deficits. [] Progressing: [x] Met: [] Not Met: [] Adjusted     Long Term Goals: To be achieved in: 12 weeks  1.  Disability index score of 10% or less for the Mod Oswestry to assist with reaching prior level of function. [x] Progressing: [] Met: [] Not Met: [] Adjusted   2. Patient will demonstrate increased AROM to equal the opposite side bilaterally to allow for proper joint functioning as indicated by patients Functional Deficits. [] Progressing: [x] Met: [] Not Met: [] Adjusted   3. Patient will demonstrate an increase in strength to of B LE to 4+/5 grossly to allow for proper functional mobility as indicated by patients Functional Deficits. [] Progressing: [x] Met: [] Not Met: [] Adjusted   4. Patient will return to all transfers, work activities, and functional activities without increased symptoms or restriction. [] Progressing: [x] Met: [] Not Met: [] Adjusted   5. Patient will have 0/10 pain with ADL's.  [] Progressing: [x] Met: [] Not Met: [] Adjusted   6. Patient stated goal: to be able to sleep without interruption during sleep  [x] Progressing: [] Met: [] Not Met: [] Adjusted     Overall Progression Towards Functional goals/ Treatment Progress Update:  [x] Patient is progressing as expected towards functional goals listed. [] Progression is slowed due to complexities/Impairments listed. [] Progression has been slowed due to co-morbidities.   [] Plan just implemented, too soon to assess goals progression <30days   [] Goals require adjustment due to lack of progress  [] Patient is not progressing as expected and requires additional follow up with physician  [] Other    Prognosis for POC: [x] Good [] Fair  [] Poor      Patient requires continued skilled intervention: [x] Yes  [] No    Treatment/Activity Tolerance:  [x] Patient able to complete treatment  [] Patient limited by fatigue  [] Patient limited by pain    [] Patient limited by other medical complications  [] Other:     Return to Play: (if applicable)   []  Stage 1: Intro to Strength   []  Stage 2: Return to Run and Strength   []  Stage 3: Return to Jump and Strength   []  Stage 4: Dynamic Strength and

## 2020-02-20 ENCOUNTER — HOSPITAL ENCOUNTER (OUTPATIENT)
Dept: PHYSICAL THERAPY | Age: 65
Setting detail: THERAPIES SERIES
Discharge: HOME | End: 2020-02-20
Payer: COMMERCIAL

## 2020-02-20 PROCEDURE — 97110 THERAPEUTIC EXERCISES: CPT | Performed by: PHYSICAL THERAPIST

## 2020-02-20 PROCEDURE — 97140 MANUAL THERAPY 1/> REGIONS: CPT | Performed by: PHYSICAL THERAPIST

## 2020-02-20 PROCEDURE — 97112 NEUROMUSCULAR REEDUCATION: CPT | Performed by: PHYSICAL THERAPIST

## 2020-02-20 NOTE — FLOWSHEET NOTE
[facility], [facility],  [facility] [address], [address]         Physical Therapy Treatment Note/ Progress Report:     Date:  2020    Patient Name:  Juliana Faulkner    :  1955  MRN: [id number]  Restrictions/Precautions:    Medical/Treatment Diagnosis Information:  · Diagnosis:  Acute Low Back Pain, Polymyalgia, Pain of Right leg  · Treatment Diagnosis: M54.5, M35.3, I86.117  Insurance/Certification information:  PT Insurance Information: Aetna  Physician Information:  Referring Practitioner: Keila Liu  Has the plan of care been signed (Y/N):        []  Yes  [x]  No     Date of Patient follow up with Physician:     Is this a Progress Report:     []  Yes  [x]  No      If Yes:  Date Range for reporting period:  Initial Eval: 19    Beginnin20 ----- Ending: 3/6/20    Progress report will be due (10 Rx or 30 days whichever is less): 56    Recertification will be due (POC Duration  / 90 days whichever is less): 20    Visit # Insurance Allowable Auth Required   2019 90      90 []  Yes []  No      Functional Scale: Mod Oswestry: 10%   Date assessed:  19  Functional Scale: Mod Oswestry 7/50 = 14%  Date assessed: 2020  Functional Scale: Mod Oswestry 6/50 = 12%  Date assessed: 2020       Latex Allergy:  [x]NO      []YES  Preferred Language for Healthcare:   [x]English       []other:    Pain level: 2/10      SUBJECTIVE:  \"I have some good days and some bad days. .. today has been a good day\"    OBJECTIVE: See eval   Observation:   Right leg showing a leg length discrepancy (right shorter).  2020    Test measurements:      RESTRICTIONS/PRECAUTIONS: ** Right leg radiculopathy  Exercises/Interventions:   Therapeutic Ex (92376)  Therapeutic Activity (83346)  NMR re-education (87250) Sets/Reps Notes/CUES   Bike 5' L5         HR/ Slant Board  X30 / 3x30\"    Earlyne Player with ab set  2x15    HS Curl / Hip ABD with ab set  2x15 R, L ea    Mini squats x20 Sport Specific Training     []  Ready to Return to Play, [employer]   []  Not Ready for Return to Sports   Comments:                         PLAN:   [x] Continue per plan of care [] Alter current plan (see comments above)  [] Plan of care initiated [] Hold pending MD visit [] Discharge    Electronically signed by:  Fran Rangel PT PTA  Note: If patient does not return for scheduled/ recommended follow up visits, this note will serve as a discharge from care along with most recent update on progress.

## 2020-02-25 ENCOUNTER — HOSPITAL ENCOUNTER (OUTPATIENT)
Dept: PHYSICAL THERAPY | Age: 65
Setting detail: THERAPIES SERIES
Discharge: HOME | End: 2020-02-25
Payer: COMMERCIAL

## 2020-02-25 PROCEDURE — 97112 NEUROMUSCULAR REEDUCATION: CPT | Performed by: PHYSICAL THERAPIST

## 2020-02-25 PROCEDURE — 97140 MANUAL THERAPY 1/> REGIONS: CPT | Performed by: PHYSICAL THERAPIST

## 2020-02-25 PROCEDURE — 97110 THERAPEUTIC EXERCISES: CPT | Performed by: PHYSICAL THERAPIST

## 2020-02-25 NOTE — FLOWSHEET NOTE
[facility], [address] [place] [address] [place], [place], [facility]         Physical Therapy Treatment Note/ Progress Report:     Date:  2020    Patient Name:  Derian Henning    :  1955  MRN: [id number]  Restrictions/Precautions:    Medical/Treatment Diagnosis Information:  · Diagnosis:  Acute Low Back Pain, Polymyalgia, Pain of Right leg  · Treatment Diagnosis: M54.5, M35.3, X13.234  Insurance/Certification information:  PT Insurance Information: Aetna  Physician Information:  Referring Practitioner: Khoa Franklin  Has the plan of care been signed (Y/N):        []  Yes  [x]  No     Date of Patient follow up with Physician:     Is this a Progress Report:     []  Yes  [x]  No      If Yes:  Date Range for reporting period:  Initial Eval: 19    Beginnin20 ----- Ending: 3/6/20    Progress report will be due (10 Rx or 30 days whichever is less): 57    Recertification will be due (POC Duration  / 90 days whichever is less): 20    Visit # Insurance Allowable Auth Required   2019 90      90 []  Yes []  No      Functional Scale: Mod Oswestry: 10%   Date assessed:  19  Functional Scale: Mod Oswestry 7/50 = 14%  Date assessed: 2020  Functional Scale: Mod Oswestry 6/50 = 12%  Date assessed: 2020       Latex Allergy:  [x]NO      []YES  Preferred Language for Healthcare:   [x]English       []other:    Pain level: 1/10      SUBJECTIVE:  \"the heel lift I've been wearing that you gave me is really helping me\"    OBJECTIVE: See eval   Observation:   Right leg showing a leg length discrepancy (right shorter).  2020    Test measurements:      RESTRICTIONS/PRECAUTIONS: ** Right leg radiculopathy  Exercises/Interventions:   Therapeutic Ex (23250)  Therapeutic Activity (27146)  NMR re-education (10210) Sets/Reps Notes/CUES   Bike 5' L5         HR/ Slant Board  X30 / 3x30\"    Gladys Ogren with ab set  2x15    HS Curl / Hip ABD with ab set  2x15 R, L ea    Mini squats x20 Supine ADD squeeze 5\" x20    Supine abd  x20 Purple clams   PPT 5\" x20    Lumbar Rotations  5\" x20 R, L     Bridge  x20    Hook lying crunches  x20    SL clams x20 PTB   SLR 3x10 3#   Supine Bicylcle x20 ea 3#   LAQ 3# x20    SAQ 3# 2x15 R, L         Supine HS Stretch  3x30\"    Piriformis stretch  3x30\"                             HEP Code  [id number]                           Manual Intervention (74402)     Intermittent Manual Traction / hook lying as well 7 min Relief with single long leg distraction   Deric stretch 3x30\"    IT band stretch 3x30\"         HS and Piriformis stretch          MET (left leg longer than right) - performed shot gun technique  5 min                   Patient Education 5' Pt ed with HEP and progress of PT tx       Therapeutic Exercise and NMR EXR  [x] (16376) Provided verbal/tactile cueing for activities related to strengthening, flexibility, endurance, ROM for improvements in LE, proximal hip, and core control with self care, mobility, lifting, ambulation. [x] (44265) Provided verbal/tactile cueing for activities related to improving balance, coordination, kinesthetic sense, posture, motor skill, proprioception to assist with LE, proximal hip, and core control in self-care, mobility, lifting, ambulation and eccentric single leg control. NMR and Therapeutic Activities:    [x] (91218 or 33480) Provided verbal/tactile cueing for activities related to improving balance, coordination, kinesthetic sense, posture, motor skill, proprioception and motor activation to allow for proper function of core, proximal hip and LE with self-care and ADLs and functional mobility.    [x] (44881) Gait Re-education- Provided training and instruction to the patient for proper LE, core and proximal hip recruitment and positioning and eccentric body weight control with ambulation re-education including up and down stairs     Home Exercise Program:    [x] (03410) Reviewed/Progressed HEP activities related Agility   []  Stage 5: Sport Specific Training     []  Ready to Return to Play, [employer]   []  Not Ready for Return to Sports   Comments:                         PLAN:   [x] Continue per plan of care [] Alter current plan (see comments above)  [] Plan of care initiated [] Hold pending MD visit [] Discharge    Electronically signed by:  Jefferson Pennington PT  Note: If patient does not return for scheduled/ recommended follow up visits, this note will serve as a discharge from care along with most recent update on progress.

## 2020-02-26 RX ORDER — ATENOLOL 25 MG/1
TABLET ORAL
Qty: 90 TABLET | Refills: 1 | Status: SHIPPED | OUTPATIENT
Start: 2020-02-26 | End: 2020-08-17

## 2020-02-26 NOTE — TELEPHONE ENCOUNTER
Refilled medication per verbal order from provider.   Future appt scheduled 03/02/2020  Last appt 11/05/2019

## 2020-02-27 ENCOUNTER — HOSPITAL ENCOUNTER (OUTPATIENT)
Dept: PHYSICAL THERAPY | Age: 65
Setting detail: THERAPIES SERIES
Discharge: HOME | End: 2020-02-27
Payer: COMMERCIAL

## 2020-02-27 PROCEDURE — 97112 NEUROMUSCULAR REEDUCATION: CPT | Performed by: PHYSICAL THERAPY ASSISTANT

## 2020-02-27 PROCEDURE — 97140 MANUAL THERAPY 1/> REGIONS: CPT | Performed by: PHYSICAL THERAPY ASSISTANT

## 2020-02-27 PROCEDURE — 97110 THERAPEUTIC EXERCISES: CPT | Performed by: PHYSICAL THERAPY ASSISTANT

## 2020-02-27 NOTE — FLOWSHEET NOTE
[facility], [address] [place] [address], [facility]         Physical Therapy Treatment Note/ Progress Report:     Date:  2020    Patient Name:  Marily Beckford    :  1955  MRN: [id number]  Restrictions/Precautions:    Medical/Treatment Diagnosis Information:  · Diagnosis:  Acute Low Back Pain, Polymyalgia, Pain of Right leg  · Treatment Diagnosis: M54.5, M35.3, R24.810  Insurance/Certification information:  PT Insurance Information: Aetna  Physician Information:  Referring Practitioner: Susannah Quiroz  Has the plan of care been signed (Y/N):        []  Yes  [x]  No     Date of Patient follow up with Physician:     Is this a Progress Report:     []  Yes  [x]  No      If Yes:  Date Range for reporting period:  Initial Eval: 19    Beginnin20 ----- Ending: 3/6/20    Progress report will be due (10 Rx or 30 days whichever is less): 64    Recertification will be due (POC Duration  / 90 days whichever is less): 20    Visit # Insurance Allowable Auth Required   2019 90      90 []  Yes []  No      Functional Scale: Mod Oswestry: 10%   Date assessed:  19  Functional Scale: Mod Oswestry 7/50 = 14%  Date assessed: 2020  Functional Scale: Mod Oswestry 6/50 = 12%  Date assessed: 2020       Latex Allergy:  [x]NO      []YES  Preferred Language for Healthcare:   [x]English       []other:    Pain level: 1/10      SUBJECTIVE: \"I do feel better with the heel lift and the ache is not as deep, so I think its getting better\"     OBJECTIVE: See eval   Observation:   Right leg showing a leg length discrepancy (right shorter).  2020    Test measurements:      RESTRICTIONS/PRECAUTIONS: ** Right leg radiculopathy  Exercises/Interventions:   Therapeutic Ex (09728)  Therapeutic Activity (80269)  NMR re-education (24718) Sets/Reps Notes/CUES   Bike 5' L5         HR/ Slant Board  X30 / 3x30\"    Versie Dukes with ab set  2# 2x15    HS Curl / Hip ABD with ab set  2x15 R, L ea 2#   Mini squats x20              Supine ADD squeeze 5\" x20    Supine abd  x20 Purple clams   PPT 5\" x20    Lumbar Rotations  5\" x20 R, L     Bridge  x20    Hook lying crunches  x20    SL clams x30 PTB   SLR 3x10 3#   Supine Bicylcle x20 ea    90/90 position with circles 4# 2x20 ea    LAQ 3# x20    SAQ 3# 2x15 R, L         Supine HS Stretch  3x30\"    Piriformis stretch  3x30\"                             HEP Code  [id number]                           Manual Intervention (13638)     Intermittent Manual Traction / hook lying as well 7 min Relief with single long leg distraction   Deric stretch 3x30\"    IT band stretch 3x30\"         HS and Piriformis stretch          MET (left leg longer than right) - performed shot gun technique  5 min                   Patient Education 5' Pt ed with HEP and progress of PT tx       Therapeutic Exercise and NMR EXR  [x] (01697) Provided verbal/tactile cueing for activities related to strengthening, flexibility, endurance, ROM for improvements in LE, proximal hip, and core control with self care, mobility, lifting, ambulation. [x] (14499) Provided verbal/tactile cueing for activities related to improving balance, coordination, kinesthetic sense, posture, motor skill, proprioception to assist with LE, proximal hip, and core control in self-care, mobility, lifting, ambulation and eccentric single leg control. NMR and Therapeutic Activities:    [x] (57179 or 96041) Provided verbal/tactile cueing for activities related to improving balance, coordination, kinesthetic sense, posture, motor skill, proprioception and motor activation to allow for proper function of core, proximal hip and LE with self-care and ADLs and functional mobility.    [x] (24407) Gait Re-education- Provided training and instruction to the patient for proper LE, core and proximal hip recruitment and positioning and eccentric body weight control with ambulation re-education including up and down stairs     Home Exercise Program:    [x] (25275) Reviewed/Progressed HEP activities related to strengthening, flexibility, endurance, ROM of core, proximal hip and LE for functional self-care, mobility, lifting and ambulation/stair navigation   [x] (42448) Reviewed/Progressed HEP activities related to improving balance, coordination, kinesthetic sense, posture, motor skill, proprioception of core, proximal hip and LE for self-care, mobility, lifting, and ambulation/stair navigation      Manual Treatments:  PROM / STM / Oscillations-Mobs:  G-I, II, III, IV (PA's, Inf., Post.)  [x] (15121) Provided manual therapy to mobilize LE, proximal hip and/or LS spine soft tissue/joints for the purpose of modulating pain, promoting relaxation, increasing ROM, reducing/eliminating soft tissue swelling/inflammation/restriction, improving soft tissue extensibility and allowing for proper ROM for normal function with self-care, mobility, lifting and ambulation. Modalities:      Charges:  Timed Code Treatment Minutes: 54'   Total Treatment Minutes:  54'   150 [facility]:  [facility] TIME:  MANUAL TIME:  UNTIMED MINUTES:   -  -  -  -      [] EVAL (LOW) 39270 (typically 20 minutes face-to-face)  [] EVAL (MOD) 97645 (typically 30 minutes face-to-face)  [] EVAL (HIGH) 89593 (typically 45 minutes face-to-face)  [] RE-EVAL     [x] BL(79529) x   2  [] IONTO  [x] NMR (09868) x   1  [] VASO  [x] Manual (38418) x    1 [] Other:  [] TA x      [] [facility] (37708)  [] ES(attended) (47930)      [] ES (un) (16774):    ASSESSMENT:  See eval    GOALS:   Short Term Goals: To be achieved in: 2 weeks  1. Independent in HEP and progression per patient tolerance, in order to prevent re-injury. [] Progressing: [x] Met: [] Not Met: [] Adjusted   2. Patient will have a decrease in pain to facilitate improvement in movement, function, and ADLs as indicated by Functional Deficits. [] Progressing: [x] Met: [] Not Met: [] Adjusted     Long Term Goals:  To be achieved in: 12

## 2020-03-02 ENCOUNTER — OFFICE VISIT (OUTPATIENT)
Dept: FAMILY MEDICINE CLINIC | Age: 65
End: 2020-03-02
Payer: COMMERCIAL

## 2020-03-02 ENCOUNTER — HOSPITAL ENCOUNTER (OUTPATIENT)
Dept: PHYSICAL THERAPY | Age: 65
Setting detail: THERAPIES SERIES
Discharge: HOME | End: 2020-03-02
Payer: COMMERCIAL

## 2020-03-02 VITALS
OXYGEN SATURATION: 94 % | HEART RATE: 62 BPM | WEIGHT: 167 LBS | TEMPERATURE: 98.3 F | DIASTOLIC BLOOD PRESSURE: 76 MMHG | SYSTOLIC BLOOD PRESSURE: 112 MMHG | BODY MASS INDEX: 30.54 KG/M2

## 2020-03-02 PROCEDURE — 99214 OFFICE O/P EST MOD 30 MIN: CPT | Performed by: FAMILY MEDICINE

## 2020-03-02 PROCEDURE — 97140 MANUAL THERAPY 1/> REGIONS: CPT | Performed by: PHYSICAL THERAPIST

## 2020-03-02 PROCEDURE — 97112 NEUROMUSCULAR REEDUCATION: CPT | Performed by: PHYSICAL THERAPIST

## 2020-03-02 PROCEDURE — 97110 THERAPEUTIC EXERCISES: CPT | Performed by: PHYSICAL THERAPIST

## 2020-03-02 NOTE — FLOWSHEET NOTE
__________________________________________________     Physician Signature:____________________________________ Date:____________  By signing above, therapists plan is approved by physician     If you have any questions or concerns, please don't hesitate to call. Thank you for your referral.     [facility] Therapy office  ([phone])                                      Fax [phone]       Physical Therapy Treatment Note/ Progress Report:     Date:  3/2/2020    Patient Name:  Mitzy Oreilly    :  1955  MRN: [id number]  Restrictions/Precautions:    Medical/Treatment Diagnosis Information:  · Diagnosis:  Acute Low Back Pain, Polymyalgia, Pain of Right leg  · Treatment Diagnosis: M54.5, M35.3, X67.334  Insurance/Certification information:  PT Insurance Information: Aetna  Physician Information:  Referring Practitioner: An Cleary  Has the plan of care been signed (Y/N):        []  Yes  [x]  No     Date of Patient follow up with Physician:     Is this a Progress Report:     []  Yes  [x]  No      If Yes:  Date Range for reporting period:  Initial Eval: 19      Beginning: 3/3/20 ----- Endin/3/20    Progress report will be due (10 Rx or 30 days whichever is less): 29    Recertification will be due (POC Duration  / 90 days whichever is less): 20    Visit # Insurance Allowable Auth Required   2019 90      90 []  Yes []  No      Functional Scale: Mod Oswestry 5/10 = 10%  Date assessed: 19  Functional Scale: Mod Oswestry 7/50 = 14%  Date assessed: 2020  Functional Scale: Mod Oswestry 6/50 = 12%  Date assessed: 2020   Functional Scale: Mod Oswestry 6/50 = 12%   Date assessed: 3/2/2020      Latex Allergy:  [x]NO      []YES  Preferred Language for Healthcare:   [x]English       []other:    Pain level: 1/10      SUBJECTIVE:  See progress note above. OBJECTIVE: See eval   Observation:   Right leg showing a leg length discrepancy (right shorter).  2020    Test function of core, proximal hip and LE with self-care and ADLs and functional mobility. [x] (86068) Gait Re-education- Provided training and instruction to the patient for proper LE, core and proximal hip recruitment and positioning and eccentric body weight control with ambulation re-education including up and down stairs     Home Exercise Program:    [x] (34176) Reviewed/Progressed HEP activities related to strengthening, flexibility, endurance, ROM of core, proximal hip and LE for functional self-care, mobility, lifting and ambulation/stair navigation   [x] (58994) Reviewed/Progressed HEP activities related to improving balance, coordination, kinesthetic sense, posture, motor skill, proprioception of core, proximal hip and LE for self-care, mobility, lifting, and ambulation/stair navigation      Manual Treatments:  PROM / STM / Oscillations-Mobs:  G-I, II, III, IV (PA's, Inf., Post.)  [x] (16345) Provided manual therapy to mobilize LE, proximal hip and/or LS spine soft tissue/joints for the purpose of modulating pain, promoting relaxation, increasing ROM, reducing/eliminating soft tissue swelling/inflammation/restriction, improving soft tissue extensibility and allowing for proper ROM for normal function with self-care, mobility, lifting and ambulation. Modalities:      Charges:  Timed Code Treatment Minutes: 54'   Total Treatment Minutes:  54'   [facility]:  [facility] TIME:  MANUAL TIME:  UNTIMED MINUTES:   -  -  -  -      [] EVAL (LOW) 16087 (typically 20 minutes face-to-face)  [] EVAL (MOD) 20656 (typically 30 minutes face-to-face)  [] EVAL (HIGH) 39478 (typically 45 minutes face-to-face)  [] RE-EVAL     [x] TJ(25320) x   2  [] IONTO  [x] NMR (22145) x   1  [] VASO  [x] Manual (08370) x    1 [] Other:  [] TA x      [] [facility] (20592)  [] ES(attended) (60931)      [] ES (un) (21212):    ASSESSMENT:  See eval    GOALS:   Short Term Goals: To be achieved in: 2 weeks  1.  Independent in HEP and progression intervention: [x] Yes  [] No    Treatment/Activity Tolerance:  [x] Patient able to complete treatment  [] Patient limited by fatigue  [] Patient limited by pain    [] Patient limited by other medical complications  [] Other:     Return to Play: (if applicable)   []  Stage 1: Intro to Strength   []  Stage 2: Return to Run and Strength   []  Stage 3: Return to Jump and Strength   []  Stage 4: Dynamic Strength and Agility   []  Stage 5: Sport Specific Training     []  Ready to Return to Play, Meets All Above Stages   []  Not Ready for Return to Sports   Comments:                         PLAN:   [x] Continue per plan of care [] Alter current plan (see comments above)  [] Plan of care initiated [] Hold pending MD visit [] Discharge    Electronically signed by:  Adele Caballero PT, PTA  Note: If patient does not return for scheduled/ recommended follow up visits, this note will serve as a discharge from care along with most recent update on progress.

## 2020-03-02 NOTE — PATIENT INSTRUCTIONS
Please read the healthy family handout that you were given and share it with your family. Please compare this printed medication list with your medications at home to be sure they are the same. If you have any medications that are different please contact us immediately at [phone]. Also review your allergies that we have listed, these may also include medications that you have not been able to tolerate, make sure everything listed is correct. If you have any allergies that are different please contact us immediately at [phone].

## 2020-03-03 ENCOUNTER — HOSPITAL ENCOUNTER (OUTPATIENT)
Dept: PHYSICAL THERAPY | Age: 65
Setting detail: THERAPIES SERIES
Discharge: HOME | End: 2020-03-03
Payer: COMMERCIAL

## 2020-03-03 LAB
BUN / CREAT RATIO: ABNORMAL (CALC) (ref 6–22)
BUN BLDV-MCNC: 14 MG/DL (ref 7–25)
CALCIUM SERPL-MCNC: 9.9 MG/DL (ref 8.6–10.4)
CHLORIDE BLD-SCNC: 101 MMOL/L (ref 98–110)
CHOLESTEROL, TOTAL: 196 MG/DL
CHOLESTEROL/HDL RATIO: 4.5 (CALC)
CHOLESTEROL: 152 MG/DL (CALC)
CO2: 29 MMOL/L (ref 20–32)
CREAT SERPL-MCNC: 0.86 MG/DL (ref 0.5–0.99)
GFR AFRICAN AMERICAN: 84 ML/MIN/1.73M2
GFR, ESTIMATED: 72 ML/MIN/1.73M2
GLUCOSE BLD-MCNC: 100 MG/DL (ref 65–99)
HDLC SERPL-MCNC: 44 MG/DL
LDL CHOLESTEROL CALCULATED: 125 MG/DL (CALC)
POTASSIUM SERPL-SCNC: 3.8 MMOL/L (ref 3.5–5.3)
SODIUM BLD-SCNC: 137 MMOL/L (ref 135–146)
TRIGL SERPL-MCNC: 159 MG/DL

## 2020-03-03 PROCEDURE — 97140 MANUAL THERAPY 1/> REGIONS: CPT | Performed by: PHYSICAL THERAPY ASSISTANT

## 2020-03-03 PROCEDURE — 97110 THERAPEUTIC EXERCISES: CPT | Performed by: PHYSICAL THERAPY ASSISTANT

## 2020-03-03 PROCEDURE — 97112 NEUROMUSCULAR REEDUCATION: CPT | Performed by: PHYSICAL THERAPY ASSISTANT

## 2020-03-03 NOTE — FLOWSHEET NOTE
[facility], [address] [place] [address], [facility]         Physical Therapy Treatment Note/ Progress Report:     Date:  3/3/2020    Patient Name:  Alondra Garnett    :  1955  MRN: [id number]  Restrictions/Precautions:    Medical/Treatment Diagnosis Information:  · Diagnosis:  Acute Low Back Pain, Polymyalgia, Pain of Right leg  · Treatment Diagnosis: M54.5, M35.3, B93.071  Insurance/Certification information:  PT Insurance Information: Aetna  Physician Information:  Referring Practitioner: Gerry Walden  Has the plan of care been signed (Y/N):        []  Yes  [x]  No     Date of Patient follow up with Physician:     Is this a Progress Report:     []  Yes  [x]  No      If Yes:  Date Range for reporting period:  Initial Eval: 19      Beginning: 3/3/20 ----- Endin/3/20    Progress report will be due (10 Rx or 30 days whichever is less): 57    Recertification will be due (POC Duration  / 90 days whichever is less): 20    Visit # Insurance Allowable Auth Required   2019 90      90 []  Yes []  No      Functional Scale: Mod Oswestry 5/10 = 10%  Date assessed: 19  Functional Scale: Mod Oswestry 7/50 = 14%  Date assessed: 2020  Functional Scale: Mod Oswestry 6/50 = 12%  Date assessed: 2020   Functional Scale: Mod Oswestry 6/50 = 12%   Date assessed: 3/2/2020      Latex Allergy:  [x]NO      []YES  Preferred Language for Healthcare:   [x]English       []other:    Pain level: 1/10      SUBJECTIVE:  Sore from muscle spasms the other day. OBJECTIVE: See eval   Observation:   Right leg showing a leg length discrepancy (right shorter).  2020    Test measurements:      RESTRICTIONS/PRECAUTIONS: ** Right leg radiculopathy  Exercises/Interventions:   Therapeutic Ex (56086)  Therapeutic Activity (28665)  NMR re-education (33804) Sets/Reps Notes/CUES   Bike 5' L5         HR/ Slant Board  X30 / 3x30\"    [facility] with ab set  2# 2x15    HS Curl / Hip ABD with re-education including up and down stairs     Home Exercise Program:    [x] (49701) Reviewed/Progressed HEP activities related to strengthening, flexibility, endurance, ROM of core, proximal hip and LE for functional self-care, mobility, lifting and ambulation/stair navigation   [x] (87224) Reviewed/Progressed HEP activities related to improving balance, coordination, kinesthetic sense, posture, motor skill, proprioception of core, proximal hip and LE for self-care, mobility, lifting, and ambulation/stair navigation      Manual Treatments:  PROM / STM / Oscillations-Mobs:  G-I, II, III, IV (PA's, Inf., Post.)  [x] (33838) Provided manual therapy to mobilize LE, proximal hip and/or LS spine soft tissue/joints for the purpose of modulating pain, promoting relaxation, increasing ROM, reducing/eliminating soft tissue swelling/inflammation/restriction, improving soft tissue extensibility and allowing for proper ROM for normal function with self-care, mobility, lifting and ambulation. Modalities:      Charges:  Timed Code Treatment Minutes: 54'   Total Treatment Minutes:  54'   [facility]:  [facility] TIME:  MANUAL TIME:  UNTIMED MINUTES:   -  -  -  -      [] EVAL (LOW) 48258 (typically 20 minutes face-to-face)  [] EVAL (MOD) 57548 (typically 30 minutes face-to-face)  [] EVAL (HIGH) 15518 (typically 45 minutes face-to-face)  [] RE-EVAL     [x] LV(19720) x   2  [] IONTO  [x] NMR (19576) x   1  [] VASO  [x] Manual (68985) x    1 [] Other:  [] TA x      [] Mech Traction (42232)  [] ES(attended) (37607)      [] ES (un) (16740):    ASSESSMENT:  See eval    GOALS:   Short Term Goals: To be achieved in: 2 weeks  1. Independent in HEP and progression per patient tolerance, in order to prevent re-injury. [] Progressing: [x] Met: [] Not Met: [] Adjusted   2. Patient will have a decrease in pain to facilitate improvement in movement, function, and ADLs as indicated by Functional Deficits.   [] Progressing: [x] Met: [] Not Met: [] Adjusted     Long Term Goals: To be achieved in: 12 weeks  1. Disability index score of 10% or less for the Mod Oswestry to assist with reaching prior level of function. [x] Progressing: [] Met: [] Not Met: [] Adjusted   2. Patient will demonstrate increased AROM to equal the opposite side bilaterally to allow for proper joint functioning as indicated by patients Functional Deficits. [] Progressing: [x] Met: [] Not Met: [] Adjusted   3. Patient will demonstrate an increase in strength to of B LE to 4+/5 grossly to allow for proper functional mobility as indicated by patients Functional Deficits. [] Progressing: [x] Met: [] Not Met: [] Adjusted   4. Patient will return to all transfers, work activities, and functional activities without increased symptoms or restriction. [] Progressing: [x] Met: [] Not Met: [] Adjusted   5. Patient will have 0/10 pain with ADL's.  [] Progressing: [x] Met: [] Not Met: [] Adjusted   6. Patient stated goal: to be able to sleep without interruption during sleep  [x] Progressing: [] Met: [] Not Met: [] Adjusted     Overall Progression Towards Functional goals/ Treatment Progress Update:  [x] Patient is progressing as expected towards functional goals listed. [] Progression is slowed due to complexities/Impairments listed. [] Progression has been slowed due to co-morbidities.   [] Plan just implemented, too soon to assess goals progression <30days   [] Goals require adjustment due to lack of progress  [] Patient is not progressing as expected and requires additional follow up with physician  [] Other    Prognosis for POC: [x] Good [] Fair  [] Poor      Patient requires continued skilled intervention: [x] Yes  [] No    Treatment/Activity Tolerance:  [x] Patient able to complete treatment  [] Patient limited by fatigue  [] Patient limited by pain    [] Patient limited by other medical complications  [] Other:     Return to Play: (if applicable)   []  Stage 1: Intro to Strength   []  Stage 2: Return to Run and Strength   []  Stage 3: Return to Jump and Strength   []  Stage 4: Dynamic Strength and Agility   []  Stage 5: Sport Specific Training     []  Ready to Return to Play, Meets All Above Stages   []  Not Ready for Return to Sports   Comments:                         PLAN:   [x] Continue per plan of care [] Alter current plan (see comments above)  [] Plan of care initiated [] Hold pending MD visit [] Discharge    Electronically signed by:  Christos Tavera PTA  Note: If patient does not return for scheduled/ recommended follow up visits, this note will serve as a discharge from care along with most recent update on progress.

## 2020-03-10 ENCOUNTER — HOSPITAL ENCOUNTER (OUTPATIENT)
Dept: PHYSICAL THERAPY | Age: 65
Setting detail: THERAPIES SERIES
Discharge: HOME | End: 2020-03-10
Payer: COMMERCIAL

## 2020-03-10 PROCEDURE — 97110 THERAPEUTIC EXERCISES: CPT | Performed by: PHYSICAL THERAPY ASSISTANT

## 2020-03-10 PROCEDURE — 97140 MANUAL THERAPY 1/> REGIONS: CPT | Performed by: PHYSICAL THERAPY ASSISTANT

## 2020-03-10 PROCEDURE — 97112 NEUROMUSCULAR REEDUCATION: CPT | Performed by: PHYSICAL THERAPY ASSISTANT

## 2020-03-10 NOTE — FLOWSHEET NOTE
[facility], [address] [place] [address], [id number]         Physical Therapy Treatment Note/ Progress Report:     Date:  3/10/2020    Patient Name:  Garcia Beasley    :  1955  MRN: [id number]  Restrictions/Precautions:    Medical/Treatment Diagnosis Information:  · Diagnosis:  Acute Low Back Pain, Polymyalgia, Pain of Right leg  · Treatment Diagnosis: M54.5, M35.3, H14.383  Insurance/Certification information:  PT Insurance Information: Aetna  Physician Information:  Referring Practitioner: Corinne Massed  Has the plan of care been signed (Y/N):        []  Yes  [x]  No     Date of Patient follow up with Physician:     Is this a Progress Report:     []  Yes  [x]  No      If Yes:  Date Range for reporting period:  Initial Eval: 19      Beginning: 3/3/20 ----- Endin/3/20    Progress report will be due (10 Rx or 30 days whichever is less):     Recertification will be due (POC Duration  / 90 days whichever is less): 20    Visit # Insurance Allowable Auth Required   2019 90      90 []  Yes []  No      Functional Scale: Mod Oswestry 5/10 = 10%  Date assessed: 19  Functional Scale: Mod Oswestry 7/50 = 14%  Date assessed: 2020  Functional Scale: Mod Oswestry 6/50 = 12%  Date assessed: 2020   Functional Scale: Mod Oswestry 6/50 = 12%   Date assessed: 3/2/2020      Latex Allergy:  [x]NO      []YES  Preferred Language for Healthcare:   [x]English       []other:    Pain level: 1/10      SUBJECTIVE:  \"I am pretty pleased with progress this time. I feel like I can try my HEP independently for a few weeks and then see     OBJECTIVE: See eval   Observation:   Right leg showing a leg length discrepancy (right shorter).  2020    Test measurements:      RESTRICTIONS/PRECAUTIONS: ** Right leg radiculopathy  Exercises/Interventions:   Therapeutic Ex (00782)  Therapeutic Activity (93681)  NMR re-education (07181) Sets/Reps Notes/CUES   Bike 5' L5         HR/ Slant Board  X30 / 3x30\"    Rulon Masters with ab set  2# 2x15    HS Curl / Hip ABD with ab set  2x15 R, L ea 2#   Mini squats x20    Leg Press 80# x30 DL         Supine ADD squeeze 5\" x20    Supine abd  x20 Purple clams   PPT 5\" x20    Lumbar Rotations  5\" x20 R, L     Bridge  x20    Hook lying crunches  x20    SL clams x30 PTB   SLR 3x10 3#   Supine Bicylcle x20 ea    90/90 position with circles 4# 2x20 ea    LAQ 3# x20    SAQ 3# 2x15 R, L         Supine HS Stretch  3x30\"    Piriformis stretch  3x30\"                             HEP Code  [id number]                           Manual Intervention (02329)     Intermittent Manual Traction / hook lying as well 7 min Relief with single long leg distraction   Deric stretch  (some back pain)   IT band stretch 3x30\"         HS and Piriformis stretch          MET (left leg longer than right) - performed shot gun technique  5 min                   Patient Education 5' Pt ed with HEP and progress of PT tx       Therapeutic Exercise and NMR EXR  [x] (15953) Provided verbal/tactile cueing for activities related to strengthening, flexibility, endurance, ROM for improvements in LE, proximal hip, and core control with self care, mobility, lifting, ambulation. [x] (01483) Provided verbal/tactile cueing for activities related to improving balance, coordination, kinesthetic sense, posture, motor skill, proprioception to assist with LE, proximal hip, and core control in self-care, mobility, lifting, ambulation and eccentric single leg control. NMR and Therapeutic Activities:    [x] (12630 or 67628) Provided verbal/tactile cueing for activities related to improving balance, coordination, kinesthetic sense, posture, motor skill, proprioception and motor activation to allow for proper function of core, proximal hip and LE with self-care and ADLs and functional mobility.    [x] (35159) Gait Re-education- Provided training and instruction to the patient for proper LE, core and proximal hip

## 2020-03-19 ENCOUNTER — HOSPITAL ENCOUNTER (OUTPATIENT)
Dept: PHYSICAL THERAPY | Age: 65
Setting detail: THERAPIES SERIES
End: 2020-03-19
Payer: COMMERCIAL

## 2020-06-19 RX ORDER — OMEPRAZOLE 20 MG/1
CAPSULE, DELAYED RELEASE ORAL
Qty: 90 CAPSULE | Refills: 1 | Status: SHIPPED | OUTPATIENT
Start: 2020-06-19 | End: 2021-11-08 | Stop reason: ALTCHOICE

## 2020-07-06 RX ORDER — LOSARTAN POTASSIUM AND HYDROCHLOROTHIAZIDE 25; 100 MG/1; MG/1
TABLET ORAL
Qty: 90 TABLET | Refills: 1 | Status: SHIPPED | OUTPATIENT
Start: 2020-07-06 | End: 2020-12-23

## 2020-08-17 RX ORDER — ATENOLOL 25 MG/1
TABLET ORAL
Qty: 90 TABLET | Refills: 1 | Status: SHIPPED | OUTPATIENT
Start: 2020-08-17 | End: 2021-02-08

## 2020-10-06 ENCOUNTER — OFFICE VISIT (OUTPATIENT)
Dept: FAMILY MEDICINE CLINIC | Age: 65
End: 2020-10-06
Payer: COMMERCIAL

## 2020-10-06 VITALS
SYSTOLIC BLOOD PRESSURE: 131 MMHG | HEART RATE: 69 BPM | BODY MASS INDEX: 30.66 KG/M2 | DIASTOLIC BLOOD PRESSURE: 81 MMHG | WEIGHT: 166.6 LBS | TEMPERATURE: 98.6 F | HEIGHT: 62 IN | OXYGEN SATURATION: 97 %

## 2020-10-06 PROCEDURE — 99214 OFFICE O/P EST MOD 30 MIN: CPT | Performed by: FAMILY MEDICINE

## 2020-10-06 PROCEDURE — 36415 COLL VENOUS BLD VENIPUNCTURE: CPT | Performed by: FAMILY MEDICINE

## 2020-10-06 RX ORDER — BIMATOPROST 0.3 MG/ML
SOLUTION/ DROPS OPHTHALMIC
Qty: 7.5 ML | Refills: 3 | Status: SHIPPED | OUTPATIENT
Start: 2020-10-06 | End: 2020-10-26 | Stop reason: SDUPTHER

## 2020-10-06 RX ORDER — DOXYCYCLINE HYCLATE 100 MG/1
100 CAPSULE ORAL DAILY
Qty: 90 CAPSULE | Refills: 0 | Status: SHIPPED | OUTPATIENT
Start: 2020-10-06 | End: 2022-05-09 | Stop reason: SDUPTHER

## 2020-10-06 ASSESSMENT — PATIENT HEALTH QUESTIONNAIRE - PHQ9
1. LITTLE INTEREST OR PLEASURE IN DOING THINGS: 0
2. FEELING DOWN, DEPRESSED OR HOPELESS: 0
SUM OF ALL RESPONSES TO PHQ QUESTIONS 1-9: 0
SUM OF ALL RESPONSES TO PHQ QUESTIONS 1-9: 0
SUM OF ALL RESPONSES TO PHQ9 QUESTIONS 1 & 2: 0

## 2020-10-06 NOTE — PROGRESS NOTES
Subjective:   She presents for follow up of her high blood pressure high cholesterol and IBS she is also having flareup of her acne. Few years ago dermatology had her on doxycycline 1 a day at bedtime for about 3 months and it worked for a long time she would like to try that again. She states her blood pressures have been good. She still on a low-cholesterol diet. She is still using Bentyl as needed for her IBS and it helps        She is allergic to sudafed [pseudoephedrine hcl]. She   reports that she has never smoked. She has never used smokeless tobacco. Review of systems negative for chest pain swelling shortness of breath wheezing abdominal pain rectal bleeding  Objective:   /81   Pulse 69   Temp 98.6 °F (37 °C) (Oral)   Ht 5' 2\" (1.575 m)   Wt 166 lb 9.6 oz (75.6 kg)   SpO2 97%   BMI 30.47 kg/m²   BP Readings from Last 3 Encounters:   10/06/20 131/81   03/02/20 112/76   12/15/19 139/72     Physical Exam  Vitals signs reviewed. Constitutional:       Appearance: She is well-developed. She is not toxic-appearing. HENT:      Head: Normocephalic. Eyes:      General: No scleral icterus. Right eye: No discharge. Left eye: No discharge. Conjunctiva/sclera: Conjunctivae normal.   Neck:      Musculoskeletal: Neck supple. Thyroid: No thyroid mass or thyromegaly. Vascular: No carotid bruit. Cardiovascular:      Rate and Rhythm: Normal rate and regular rhythm. Heart sounds: Normal heart sounds. No murmur. Pulmonary:      Effort: No respiratory distress. Breath sounds: Normal breath sounds. No wheezing or rales. Abdominal:      General: There is no distension. Palpations: Abdomen is soft. There is no mass. Tenderness: There is no abdominal tenderness. There is no guarding or rebound. Lymphadenopathy:      Cervical: No cervical adenopathy. Upper Body:      Right upper body: No supraclavicular adenopathy. Skin:     General: Skin is warm. Neurological:      Mental Status: She is alert and oriented to person, place, and time. Psychiatric:         Behavior: Behavior normal.         Thought Content: Thought content normal.         Judgment: Judgment normal.     Acne from where her mask is  Assessment and Plan:   Diagnosis Orders   1. Essential hypertension  Comprehensive Metabolic Panel   2. Pure hypercholesterolemia  Lipid Panel   3. Irritable bowel syndrome with both constipation and diarrhea     4. Acne vulgaris  doxycycline hyclate (VIBRAMYCIN) 100 MG capsule   Will use doxycycline for 3 months then stop  The problems listed in the assessment are stable unless otherwise indicated. She  was instructed to continue their current medications and treatment for the aboveproblems unless otherwise indicated above. Age-specific preventative medicine recommendations were reviewed with patient today and the Healthy Family Handout was given to patient. Avoid tobacco products exposure. Follow up in 6 months. Call or return to office for any problems that develop before the next scheduled follow-up appointment. Candace Orr M.D. Parts of this note were completed using voice recognition transcription. Every effort was made to ensure accuracy; however, inadvertent transcription errors may be present.

## 2020-10-06 NOTE — PATIENT INSTRUCTIONS
Please read the healthy family handout that you were given and share it with your family. Please compare this printed medication list with your medications at home to be sure they are the same. If you have any medications that are different please contact us immediately at [phone]. Also review your allergies that we have listed, these may also include medications that you have not been able to tolerate, make sure everything listed is correct. If you have any allergies that are different please contact us immediately at [phone].

## 2020-10-07 PROBLEM — R74.8 ELEVATED LIVER ENZYMES: Status: ACTIVE | Noted: 2020-10-07

## 2020-10-07 PROBLEM — K76.0 FATTY LIVER DISEASE, NONALCOHOLIC: Status: ACTIVE | Noted: 2020-10-07

## 2020-10-07 LAB
A/G RATIO: 1.7 (ref 1.1–2.2)
ALBUMIN SERPL-MCNC: 4.6 G/DL (ref 3.4–5)
ALP BLD-CCNC: 74 U/L (ref 40–129)
ALT SERPL-CCNC: 60 U/L (ref 10–40)
ANION GAP SERPL CALCULATED.3IONS-SCNC: 14 MMOL/L (ref 3–16)
AST SERPL-CCNC: 48 U/L (ref 15–37)
BILIRUB SERPL-MCNC: 1.3 MG/DL (ref 0–1)
BUN BLDV-MCNC: 15 MG/DL (ref 7–20)
CALCIUM SERPL-MCNC: 10.1 MG/DL (ref 8.3–10.6)
CHLORIDE BLD-SCNC: 100 MMOL/L (ref 99–110)
CHOLESTEROL, TOTAL: 199 MG/DL (ref 0–199)
CO2: 26 MMOL/L (ref 21–32)
CREAT SERPL-MCNC: 0.8 MG/DL (ref 0.6–1.2)
GFR AFRICAN AMERICAN: >60
GFR NON-AFRICAN AMERICAN: >60
GLOBULIN: 2.7 G/DL
GLUCOSE BLD-MCNC: 88 MG/DL (ref 70–99)
HDLC SERPL-MCNC: 42 MG/DL (ref 40–60)
LDL CHOLESTEROL CALCULATED: 126 MG/DL
POTASSIUM SERPL-SCNC: 4.1 MMOL/L (ref 3.5–5.1)
SODIUM BLD-SCNC: 140 MMOL/L (ref 136–145)
TOTAL PROTEIN: 7.3 G/DL (ref 6.4–8.2)
TRIGL SERPL-MCNC: 154 MG/DL (ref 0–150)
VLDLC SERPL CALC-MCNC: 31 MG/DL

## 2020-10-19 ENCOUNTER — HOSPITAL ENCOUNTER (OUTPATIENT)
Age: 65
Discharge: HOME | End: 2020-10-19
Payer: COMMERCIAL

## 2020-10-19 ENCOUNTER — INITIAL CONSULT (OUTPATIENT)
Dept: GASTROENTEROLOGY | Age: 65
End: 2020-10-19
Payer: COMMERCIAL

## 2020-10-19 VITALS
BODY MASS INDEX: 31.1 KG/M2 | HEIGHT: 62 IN | TEMPERATURE: 97.2 F | SYSTOLIC BLOOD PRESSURE: 124 MMHG | WEIGHT: 169 LBS | DIASTOLIC BLOOD PRESSURE: 70 MMHG

## 2020-10-19 LAB
HAV IGM SER IA-ACNC: NORMAL
HEPATITIS B CORE IGM ANTIBODY: NORMAL
HEPATITIS B SURFACE ANTIGEN INTERPRETATION: NORMAL
HEPATITIS C ANTIBODY INTERPRETATION: NORMAL

## 2020-10-19 PROCEDURE — 36415 COLL VENOUS BLD VENIPUNCTURE: CPT

## 2020-10-19 PROCEDURE — 99204 OFFICE O/P NEW MOD 45 MIN: CPT | Performed by: INTERNAL MEDICINE

## 2020-10-19 PROCEDURE — 80074 ACUTE HEPATITIS PANEL: CPT

## 2020-10-19 RX ORDER — ECHINACEA PURPUREA AERIAL 350 MG
CAPSULE ORAL
COMMUNITY
End: 2021-04-30 | Stop reason: ALTCHOICE

## 2020-10-19 NOTE — PROGRESS NOTES
[address]     [address] [facility] ,  [address] [facility]  Phone: [phone]   [facility] COMPLAINT     Chief Complaint   Patient presents with   [address]     NP- elevated LFTs       HPI (location/symptom, timing/onset, duration, quality/severity, context, modifying factors, and associated signs/symptoms)     Thank you Dunia Norton MD for asking me to see Ruy Mehta in consultation. She is a  [2] White [1] 63 y.o. Savita Salen female seen independently who presents with the following GI complaints:    Ruy Mehta  Is referred for mild chronic asymptomatic transaminase elevation and documented fatty liver by CT scan. No associated symptoms. Modifying factors - none. No pertinent GI family history. Denies alcohol use. Already taking Slaav and milk thisle. Lab Results   Component Value Date    ALT 60 (H) 10/06/2020    ALT 57 (H) 12/15/2019    ALT 53 (H) 11/05/2019    AST 48 (H) 10/06/2020    AST 41 (H) 12/15/2019    AST 37 11/05/2019    ALKPHOS 74 10/06/2020    ALKPHOS 70 12/15/2019    ALKPHOS 71 11/05/2019    BILITOT 1.3 (H) 10/06/2020    BILITOT 0.9 12/15/2019    BILITOT 1.0 11/05/2019    HGB 14.1 12/15/2019    HGB 14.0 11/05/2019    HGB 14.7 10/11/2016     12/15/2019     11/05/2019     10/11/2016     Lab Results   Component Value Date     10/06/2020    K 4.1 10/06/2020     10/06/2020    CO2 26 10/06/2020    BUN 15 10/06/2020    CREATININE 0.8 10/06/2020    GLUCOSE 88 10/06/2020    CALCIUM 10.1 10/06/2020    PROT 7.3 10/06/2020    LABALBU 4.6 10/06/2020     CT ABDOMEN PELVIS W IV CONTRAST Additional Contrast? None  Narrative: EXAMINATION:  CT OF THE ABDOMEN AND PELVIS WITH CONTRAST 12/15/2019 2:52 pm    TECHNIQUE:  CT of the abdomen and pelvis was performed with the administration of  intravenous contrast. Multiplanar reformatted images are provided for review.   Dose modulation, iterative reconstruction, and/or file     Minutes per session: Not on file    Stress: Not on file   Relationships    Social connections     Talks on phone: Not on file     Gets together: Not on file     Attends Christian service: Not on file     Active member of club or organization: Not on file     Attends meetings of clubs or organizations: Not on file     Relationship status: Not on file    Intimate partner violence     Fear of current or ex partner: Not on file     Emotionally abused: Not on file     Physically abused: Not on file     Forced sexual activity: Not on file   Other Topics Concern    Not on file   Social History Narrative    Not on file     SURGICAL HISTORY     Past Surgical History:   Procedure Laterality Date    COLONOSCOPY      1/2011, 2016     CURRENT MEDICATIONS   (This list may include medications prescribed during this encounter as epic can not insert only the list prior to this encounter.)  Current Outpatient Rx   Medication Sig Dispense Refill    Milk Thistle 140 MG CAPS Take by mouth      Pumpkin Seed-Soy Germ (AZO BLADDER CONTROL/GO-LESS PO) Take by mouth      bimatoprost (LUMIGAN) 0.03 % ophthalmic drops USE AS DIRECTED APPLY TO   TOP EYELASH AREA ONLY WITH APPLICATOR 1 TIME DAILY 7.5 mL 3    doxycycline hyclate (VIBRAMYCIN) 100 MG capsule Take 1 capsule by mouth daily For acne 90 capsule 0    atenolol (TENORMIN) 25 MG tablet TAKE 1 TABLET DAILY 90 tablet 1    losartan-hydrochlorothiazide (HYZAAR) 100-25 MG per tablet TAKE 1 TABLET DAILY 90 tablet 1    omeprazole (PRILOSEC) 20 MG delayed release capsule TAKE 1 CAPSULE DAILY 90 capsule 1    ondansetron (ZOFRAN) 4 MG tablet Take 1 tablet by mouth every 8 hours as needed for Nausea 10 tablet 0    dicyclomine (BENTYL) 10 MG capsule Take 1 capsule by mouth 3 times daily (before meals) 60 capsule 3    Nutritional Supplements (METHIONINE-200 PO) Take 200 mg by mouth daily      zoster recombinant adjuvanted vaccine (SHINGRIX) 50 MCG SUSR injection Give IM one 0.5ml dose now followed by a second dose in 2-6 months. 1 each 1    KRILL OIL PO Take by mouth      Multiple Vitamins-Minerals (HAIR/SKIN/NAILS/BIOTIN) TABS Take by mouth      Specialty Vitamins Products (HEALTHY HEART COMPLEX) TABS Take by mouth       ALLERGIES     Allergies   Allergen Reactions    Sudafed [Pseudoephedrine Hcl]      IMMUNIZATIONS     Immunization History   Administered Date(s) Administered    Influenza 11/15/2012    Influenza Virus Vaccine 10/01/2009    Tdap (Boostrix, Adacel) 03/10/2017    Zoster Live (Zostavax) 10/01/2015     REVIEW OF SYSTEMS (2-9 systems for level 4, 10 or more for level 5)   See HPI for further details and pertinent postiives. Negative for the following:  Constitutional: Negative for weight change. Negative for appetite change and fatigue. HENT: Negative for nosebleeds, sore throat, mouth sores, and voice change. Respiratory: Negative for cough, choking and chest tightness. Cardiovascular: Negative for chest pain   Gastrointestinal: No abdominal pain, heartburn, bloating, dysphagia, cough, chest pain, globus, regurgitation, diarrhea, constipation, nausea, or vomiting. Musculoskeletal: Negative for arthralgias. Skin: Negative for pallor. Neurological: Negative for weakness and light-headedness. Hematological: Negative for adenopathy. Does not bruise/bleed easily. Psychiatric/Behavioral: Negative for suicidal ideas. PHYSICAL EXAM   VITAL SIGNS: /70 (Site: Right Upper Arm)   Temp 97.2 °F (36.2 °C)   Ht 5' 2\" (1.575 m)   Wt 169 lb (76.7 kg)   BMI 30.91 kg/m²   Wt Readings from Last 3 Encounters:   10/19/20 169 lb (76.7 kg)   10/06/20 166 lb 9.6 oz (75.6 kg)   03/02/20 167 lb (75.8 kg)     Constitutional: Well developed, Well nourished, No acute distress, Non-toxic appearance. HENT: Normocephalic, Atraumatic, Bilateral external ears normal, Oropharynx moist, No oral exudates, Nose normal.   Eyes: Conjunctiva normal, No discharge.    Neck: Normal range of motion, No tenderness, Supple, No stridor. Lymphatic: No cervical, subclavian, or axillary lymphadenopathy. Cardiovascular: Normal heart rate, Normal rhythm, No murmurs, No rubs, No gallops. Thorax & Lungs: Normal breath sounds, No respiratory distress, No wheezing, No chest tenderness. No gynecomastia. Abdomen/GI: scars consistent with stated surgeries, no hernias, no HSM, soft NTND   Rectal:  Deferred. Skin: Warm, Dry, No erythema, No rash. No bruising. No spider hemangiomas. Back: No tenderness, No CVA tenderness. Lower Extremities: Intact distal pulses, No edema, No tenderness, No cyanosis, No clubbing. Neurologic: Alert & oriented x 3, Normal motor function, Normal sensory function, No focal deficits noted. No asterixis. RADIOLOGY/PROCEDURES       FINAL IMPRESSION     Orders Placed This Encounter   Procedures    Hepatitis Panel, Acute     Standing Status:   Future     Number of Occurrences:   1     Standing Expiration Date:   11/19/2020    Misc nursing order (specify)     Standing Status:   Future     Standing Expiration Date:   11/19/2020     Scheduling Instructions:      Schedule Fibroscan test through [facility] endoscopy. Masha was seen today for establish care. Diagnoses and all orders for this visit:    Elevated LFTs  -     Hepatitis Panel, Acute; Future  -     Misc nursing order (specify); Future    Fatty liver  -     Misc nursing order (specify); Future      The most likely cause of the uninvestigated elevated lft's is fatty liver. Medications including NSAID use can also be associated with LFT elevation. The differential diagnosis of abnormal liver enzymes includes primary, metastatic or obstructing neoplasia, viral, autoimmune, drug or toxin induced hepatitis are also possibilties.  Familial metabolic disorders  (hemochromatosis, alpha 1 antitrypsin deficiency,  Brian's disease) or NAFLD (nonalcoholic fatty liver disease) and BAILEY (nonalcoholic steatohepatitis) should be considered. In general most gastroenterologists will do additional serologic workup if LFTs are 3x elevated over 3-6 months especially if fatty liver not confirmed by imaging. If workup points to another process besides fatty liver or transaminases continue to increase, liver biopsy is indicated to determine possible cause. Otherwise if a chronic mild LFT elevation (not 3x) without obvious cause, fibroscan could be utilized to see if there is any liver damage/fibrosis although with any one chronic liver disease, this process can take 20-30 years. The presence of one chronic liver disease confers a risk of cirrhosis of 20% in 20-30 years while two liver diseases more then 50% risk. For this reason, the general recommendation of weight loss (if overweight) and moderate to heavy alcohol avoidance are universal recommendations with elevated lft's. Discussed lifestyle modification with goal of weight loss, need for fibrosis assessment along with regular use of vit E 400 bid and regular coffee consumption may all be beneficial.  Fibroscan when cause of liver disease is know may be advantageous over liver biopsy as multiple measurements are taken compared to the argument of sampling error on a single biopsy. At present there are no additional pharmacological interventions approved to reverse fibrosis but these may become available in the next 1-2 years. As little as a 7% weight loss may be enough to affect disease course. Lifestyle modification and utilization of health coaches, psychologists, dieticians, exercise physiologists, culinary medicine could all help. Discussed lifestyle interventions like healthy diet with carb control and regular exercise. ORDERED FUTURE/PENDING TESTS       FOLLOWUP   Return for after ordered tests.           Sue Quiñonez 10/19/20 2:11 PM EDT    CC:  Long Norton MD

## 2020-10-19 NOTE — LETTER
[address] ,  [address] [facility]  Phone: [phone]   DZD:[phone]  [address] [place] [address], [address] [place] [address]  Phone: [phone]   SMQ:[phone]    10/19/20    Patient:Masha Osborne  MR Number:<[id number]>  YOB: 1955  Date of Visit:10/19/20    Dear Dr. Karie Wright MD    Thank you for the request for consultation for Chavo Sarabia to me for the evaluation of   Chief Complaint   Patient presents with   Doe Salguero     NP- elevated LFTs   . Below are the relevant portions of my assessment and plan of care. FINAL DIAGNOSIS/Assessment   Diagnosis Orders   1. Elevated LFTs  Hepatitis Panel, Acute    [facility] nursing order (specify)       VISIT ORDERS/Plan  Orders Placed This Encounter   Procedures    Hepatitis Panel, Acute     Standing Status:   Future     Standing Expiration Date:   11/19/2020    Misc nursing order (specify)     Standing Status:   Future     Standing Expiration Date:   11/19/2020     Scheduling Instructions:      Schedule Fibroscan test through [facility] endoscopy. If you have questions, please do not hesitate to call me. I look forward to following Chavo Sarabia along with you.     Sincerely,        Marko Adair 10/19/20 2:29 PM EDT

## 2020-10-21 ENCOUNTER — HOSPITAL ENCOUNTER (OUTPATIENT)
Dept: ENDOSCOPY | Age: 65
Discharge: HOME | End: 2020-10-21
Payer: COMMERCIAL

## 2020-10-21 PROCEDURE — 91200 LIVER ELASTOGRAPHY: CPT

## 2020-10-21 PROCEDURE — 91200 LIVER ELASTOGRAPHY: CPT | Performed by: INTERNAL MEDICINE

## 2020-10-23 ENCOUNTER — TELEPHONE (OUTPATIENT)
Dept: GASTROENTEROLOGY | Age: 65
End: 2020-10-23

## 2020-10-23 NOTE — TELEPHONE ENCOUNTER
Spoke to patient regarding fibro scan results:     Per Dr Vanda Mathis : S3 fatty change - weight loss, diet/excersise, F0 no- mild fibrosis. See Media     Patient did not have any questions or concerns.

## 2020-10-26 RX ORDER — BIMATOPROST 0.3 MG/ML
SOLUTION/ DROPS OPHTHALMIC
Qty: 7.5 ML | Refills: 3 | Status: SHIPPED | OUTPATIENT
Start: 2020-10-26 | End: 2022-05-09 | Stop reason: CLARIF

## 2020-11-05 NOTE — TELEPHONE ENCOUNTER
Patient's insurance will not cover the Bimatoprost eye drops 0.03% but will cover Lumigan brand because it is on the formulary but as 0.01  Others it will cover is latanoprost and the travoprost and the lumigan.  Do you want to change to the Lumigan 0.01%

## 2020-11-06 RX ORDER — BIMATOPROST 0.01 %
DROPS OPHTHALMIC (EYE)
Qty: 7.5 ML | Refills: 3 | Status: SHIPPED | OUTPATIENT
Start: 2020-11-06 | End: 2022-05-09 | Stop reason: SDUPTHER

## 2020-12-23 RX ORDER — LOSARTAN POTASSIUM AND HYDROCHLOROTHIAZIDE 25; 100 MG/1; MG/1
TABLET ORAL
Qty: 90 TABLET | Refills: 1 | Status: SHIPPED | OUTPATIENT
Start: 2020-12-23 | End: 2021-06-18

## 2021-01-11 ENCOUNTER — HOSPITAL ENCOUNTER (OUTPATIENT)
Dept: MAMMOGRAPHY | Age: 66
Discharge: HOME | End: 2021-01-16
Payer: COMMERCIAL

## 2021-01-11 DIAGNOSIS — Z12.31 VISIT FOR SCREENING MAMMOGRAM: ICD-10-CM

## 2021-01-11 PROCEDURE — 77063 BREAST TOMOSYNTHESIS BI: CPT

## 2021-02-08 RX ORDER — ATENOLOL 25 MG/1
TABLET ORAL
Qty: 90 TABLET | Refills: 1 | Status: SHIPPED | OUTPATIENT
Start: 2021-02-08 | End: 2021-07-30

## 2021-04-30 ENCOUNTER — OFFICE VISIT (OUTPATIENT)
Dept: FAMILY MEDICINE CLINIC | Age: 66
End: 2021-04-30
Payer: COMMERCIAL

## 2021-04-30 VITALS
TEMPERATURE: 98.6 F | BODY MASS INDEX: 30.91 KG/M2 | HEART RATE: 61 BPM | OXYGEN SATURATION: 95 % | SYSTOLIC BLOOD PRESSURE: 116 MMHG | DIASTOLIC BLOOD PRESSURE: 76 MMHG | WEIGHT: 169 LBS

## 2021-04-30 DIAGNOSIS — K76.0 FATTY LIVER: ICD-10-CM

## 2021-04-30 DIAGNOSIS — K58.2 IRRITABLE BOWEL SYNDROME WITH BOTH CONSTIPATION AND DIARRHEA: ICD-10-CM

## 2021-04-30 DIAGNOSIS — E78.00 PURE HYPERCHOLESTEROLEMIA: ICD-10-CM

## 2021-04-30 DIAGNOSIS — I10 ESSENTIAL HYPERTENSION: Primary | ICD-10-CM

## 2021-04-30 PROCEDURE — 99214 OFFICE O/P EST MOD 30 MIN: CPT | Performed by: FAMILY MEDICINE

## 2021-04-30 RX ORDER — MULTIVIT-MIN/IRON/FOLIC ACID/K 18-600-40
5000 CAPSULE ORAL
COMMUNITY

## 2021-04-30 ASSESSMENT — PATIENT HEALTH QUESTIONNAIRE - PHQ9
SUM OF ALL RESPONSES TO PHQ QUESTIONS 1-9: 0
SUM OF ALL RESPONSES TO PHQ9 QUESTIONS 1 & 2: 0
1. LITTLE INTEREST OR PLEASURE IN DOING THINGS: 0

## 2021-04-30 NOTE — PATIENT INSTRUCTIONS
Please read the healthy family handout that you were given and share it with your family. Please compare this printed medication list with your medications at home to be sure they are the same. If you have any medications that are different please contact us immediately at [phone]. Also review your allergies that we have listed, these may also include medications that you have not been able to tolerate, make sure everything listed is correct. If you have any allergies that are different please contact us immediately at [phone].

## 2021-04-30 NOTE — PROGRESS NOTES
She presents for follow up of her high blood pressure high cholesterol IBS and fatty liver disease. She had a fiber scan done by GI back in October 2020 and she started some vitamins for her liver. Tests and documents reviewed: FibroScan and GI notes     Objective:   /76   Pulse 61   Temp 98.6 °F (37 °C) (Oral)   Wt 169 lb (76.7 kg)   SpO2 95%   BMI 30.91 kg/m²   BP Readings from Last 3 Encounters:   04/30/21 116/76   10/19/20 124/70   10/06/20 131/81     Physical Exam  Vitals signs reviewed. Constitutional:       Appearance: She is well-developed. She is not toxic-appearing. HENT:      Head: Normocephalic. Neck:      Musculoskeletal: Neck supple. Thyroid: No thyroid mass or thyromegaly. Cardiovascular:      Rate and Rhythm: Normal rate and regular rhythm. Heart sounds: Normal heart sounds. No murmur. Pulmonary:      Effort: No respiratory distress. Breath sounds: Normal breath sounds. No wheezing or rales. Abdominal:      General: There is no distension. Palpations: Abdomen is soft. There is no mass. Tenderness: There is no abdominal tenderness. There is no guarding or rebound. Lymphadenopathy:      Cervical: No cervical adenopathy. Upper Body:      Right upper body: No supraclavicular adenopathy. Skin:     General: Skin is warm. Neurological:      Mental Status: She is alert and oriented to person, place, and time. Psychiatric:         Behavior: Behavior normal.         Thought Content: Thought content normal.         Judgment: Judgment normal.       Assessment and Plan:   Diagnosis Orders   1. Essential hypertension  Basic Metabolic Panel   2. Pure hypercholesterolemia  Lipid Panel   3. Irritable bowel syndrome with both constipation and diarrhea     4. Fatty liver  Hepatic Function Panel   We discussed vitamin D supplementation and weight loss and low-fat diet for fatty liver disease.   The problems listed in the assessment are stable unless otherwise indicated. She  was instructed to continue their current medications and treatment for the above problems unless otherwise indicated above. Age-specific preventative medicine recommendations were reviewed with patient today and the Healthy Family Handout was given to patient. Avoid tobacco products exposure. I have recommended that the patient follow CDC guidelines for prevention of COVID-19 infection. Follow up in 6mo. Call or return to office for any problems that develop before the next scheduled follow-up appointment. Medina Stevens M.D. Parts of this note were completed using voice recognition transcription. Every effort was made to ensure accuracy; however, inadvertent transcription errors may be present.

## 2021-05-02 LAB
A/G RATIO: 1.5 (CALC) (ref 1–2.5)
ALBUMIN SERPL-MCNC: 4.4 G/DL (ref 3.6–5.1)
ALP BLD-CCNC: 61 U/L (ref 37–153)
ALT SERPL-CCNC: 54 U/L (ref 6–29)
AST SERPL-CCNC: 42 U/L (ref 10–35)
BILIRUB SERPL-MCNC: 0.9 MG/DL (ref 0.2–1.2)
BILIRUBIN DIRECT: 0.1 MG/DL
BILIRUBIN, INDIRECT: 0.8 MG/DL (CALC) (ref 0.2–1.2)
BUN / CREAT RATIO: NORMAL (CALC) (ref 6–22)
BUN BLDV-MCNC: 11 MG/DL (ref 7–25)
CALCIUM SERPL-MCNC: 10.1 MG/DL (ref 8.6–10.4)
CHLORIDE BLD-SCNC: 100 MMOL/L (ref 98–110)
CHOLESTEROL, TOTAL: 208 MG/DL
CHOLESTEROL/HDL RATIO: 5.2 (CALC)
CO2: 27 MMOL/L (ref 20–32)
CREAT SERPL-MCNC: 0.77 MG/DL (ref 0.5–0.99)
GFR AFRICAN AMERICAN: 95 ML/MIN/1.73M2
GFR NON-AFRICAN AMERICAN: 82 ML/MIN/1.73M2
GLOBULIN: 3 G/DL (CALC) (ref 1.9–3.7)
GLUCOSE BLD-MCNC: 98 MG/DL (ref 65–99)
HDLC SERPL-MCNC: 40 MG/DL
LDL CHOLESTEROL CALCULATED: 136 MG/DL (CALC)
NONHDLC SERPL-MCNC: 168 MG/DL (CALC)
POTASSIUM SERPL-SCNC: 3.7 MMOL/L (ref 3.5–5.3)
SODIUM BLD-SCNC: 136 MMOL/L (ref 135–146)
TOTAL PROTEIN: 7.4 G/DL (ref 6.1–8.1)
TRIGL SERPL-MCNC: 187 MG/DL

## 2021-05-03 LAB
CLIENT CONTACT:: NORMAL
REFERENCE LAB: NORMAL

## 2021-06-18 RX ORDER — LOSARTAN POTASSIUM AND HYDROCHLOROTHIAZIDE 25; 100 MG/1; MG/1
TABLET ORAL
Qty: 90 TABLET | Refills: 1 | Status: SHIPPED | OUTPATIENT
Start: 2021-06-18 | End: 2021-12-15

## 2021-06-18 NOTE — TELEPHONE ENCOUNTER
Refilled medication per verbal order from provider.   Future appt scheduled 11/08/2021  Last appt 04/30/2021

## 2021-07-30 RX ORDER — ATENOLOL 25 MG/1
TABLET ORAL
Qty: 90 TABLET | Refills: 1 | Status: SHIPPED | OUTPATIENT
Start: 2021-07-30 | End: 2022-01-26

## 2021-08-17 ENCOUNTER — TELEPHONE (OUTPATIENT)
Dept: FAMILY MEDICINE CLINIC | Age: 66
End: 2021-08-17

## 2021-08-17 NOTE — LETTER
[address]  Phone: [phone]  Fax: [phone]          August 17, 2021    Gilford Lightning and Kathyleen Lawrence  [facility] [address]      Dear Devan Sanchez and Raffi Eller: We wanted to make you aware that [facility] lab will no longer send blood work out to [facility]. All blood work drawn in our office will now have to be done at our [facility] lab. We could not find a [facility] lab that is close in our area. We suggest that you contact your insurance company to see if they can locate a lab for you. Please Note:  Effective September 1, 2021      Every Outreach/Ambulatory lab test sent to [facility] facility at [facility] will have testing performed by [facility].  Physician or patient requests to re-route specimens to other laboratories for testing will not be fulfilled if testing can be performed in-house. This is in keeping with pre-existing [facility]. If you have any questions or concerns, please don't hesitate to call.     Sincerely,    [id number] [facility],[place] [address]

## 2021-11-08 ENCOUNTER — OFFICE VISIT (OUTPATIENT)
Dept: FAMILY MEDICINE CLINIC | Age: 66
End: 2021-11-08

## 2021-11-08 VITALS
SYSTOLIC BLOOD PRESSURE: 130 MMHG | HEART RATE: 61 BPM | DIASTOLIC BLOOD PRESSURE: 84 MMHG | BODY MASS INDEX: 30.91 KG/M2 | TEMPERATURE: 98.3 F | WEIGHT: 169 LBS | OXYGEN SATURATION: 97 %

## 2021-11-08 DIAGNOSIS — K21.9 GASTROESOPHAGEAL REFLUX DISEASE WITHOUT ESOPHAGITIS: ICD-10-CM

## 2021-11-08 DIAGNOSIS — K76.0 FATTY LIVER: ICD-10-CM

## 2021-11-08 DIAGNOSIS — U07.1 COVID-19: ICD-10-CM

## 2021-11-08 DIAGNOSIS — I10 ESSENTIAL HYPERTENSION: Primary | ICD-10-CM

## 2021-11-08 DIAGNOSIS — E78.00 PURE HYPERCHOLESTEROLEMIA: ICD-10-CM

## 2021-11-08 LAB
ALBUMIN SERPL-MCNC: 4.6 G/DL (ref 3.4–5)
ALP BLD-CCNC: 71 U/L (ref 40–129)
ALT SERPL-CCNC: 68 U/L (ref 10–40)
ANION GAP SERPL CALCULATED.3IONS-SCNC: 14 MMOL/L (ref 3–16)
AST SERPL-CCNC: 51 U/L (ref 15–37)
BASOPHILS ABSOLUTE: 0 K/UL (ref 0–0.2)
BASOPHILS RELATIVE PERCENT: 0.8 %
BILIRUB SERPL-MCNC: 1 MG/DL (ref 0–1)
BILIRUBIN DIRECT: <0.2 MG/DL (ref 0–0.3)
BILIRUBIN, INDIRECT: ABNORMAL MG/DL (ref 0–1)
BUN BLDV-MCNC: 12 MG/DL (ref 7–20)
CALCIUM SERPL-MCNC: 10.2 MG/DL (ref 8.3–10.6)
CHLORIDE BLD-SCNC: 98 MMOL/L (ref 99–110)
CHOLESTEROL, TOTAL: 211 MG/DL (ref 0–199)
CO2: 24 MMOL/L (ref 21–32)
CREAT SERPL-MCNC: 0.7 MG/DL (ref 0.6–1.2)
EOSINOPHILS ABSOLUTE: 0.1 K/UL (ref 0–0.6)
EOSINOPHILS RELATIVE PERCENT: 1.7 %
GFR AFRICAN AMERICAN: >60
GFR NON-AFRICAN AMERICAN: >60
GLUCOSE BLD-MCNC: 98 MG/DL (ref 70–99)
HCT VFR BLD CALC: 40.5 % (ref 36–48)
HDLC SERPL-MCNC: 41 MG/DL (ref 40–60)
HEMOGLOBIN: 14 G/DL (ref 12–16)
LDL CHOLESTEROL CALCULATED: 141 MG/DL
LYMPHOCYTES ABSOLUTE: 1.7 K/UL (ref 1–5.1)
LYMPHOCYTES RELATIVE PERCENT: 30 %
MCH RBC QN AUTO: 30 PG (ref 26–34)
MCHC RBC AUTO-ENTMCNC: 34.5 G/DL (ref 31–36)
MCV RBC AUTO: 86.9 FL (ref 80–100)
MONOCYTES ABSOLUTE: 0.5 K/UL (ref 0–1.3)
MONOCYTES RELATIVE PERCENT: 9.2 %
NEUTROPHILS ABSOLUTE: 3.3 K/UL (ref 1.7–7.7)
NEUTROPHILS RELATIVE PERCENT: 58.3 %
PDW BLD-RTO: 13.9 % (ref 12.4–15.4)
PLATELET # BLD: 197 K/UL (ref 135–450)
PMV BLD AUTO: 9.2 FL (ref 5–10.5)
POTASSIUM SERPL-SCNC: 4 MMOL/L (ref 3.5–5.1)
RBC # BLD: 4.65 M/UL (ref 4–5.2)
SARS-COV-2 ANTIBODY, TOTAL: POSITIVE
SODIUM BLD-SCNC: 136 MMOL/L (ref 136–145)
TOTAL PROTEIN: 7.3 G/DL (ref 6.4–8.2)
TRIGL SERPL-MCNC: 144 MG/DL (ref 0–150)
VLDLC SERPL CALC-MCNC: 29 MG/DL
WBC # BLD: 5.7 K/UL (ref 4–11)

## 2021-11-08 RX ORDER — ESOMEPRAZOLE MAGNESIUM 40 MG/1
40 CAPSULE, DELAYED RELEASE ORAL
Qty: 90 CAPSULE | Refills: 2 | Status: SHIPPED | OUTPATIENT
Start: 2021-11-08 | End: 2022-06-28

## 2021-11-08 NOTE — PATIENT INSTRUCTIONS
Please read the healthy family handout that you were given and share it with your family. Please compare this printed medication list with your medications at home to be sure they are the same. If you have any medications that are different please contact us immediately at [phone]. Also review your allergies that we have listed, these may also include medications that you have not been able to tolerate, make sure everything listed is correct.  If you have any allergies that are different please contact us immediately at [phone]

## 2021-11-08 NOTE — PROGRESS NOTES
She presents for follow up of chronic health problems she wants to change from Prilosec to Nexium for her GERD because it works better for her she still has some breakthrough symptoms on Prilosec. She had Covid last November and she wants to get antibody test done to see if she still has immunity. I explained to her that this does not show if her immunity is protective or not but she still would like to have the test.  Otherwise she has been doing well. Tests and documents reviewed: Last note and lab work     Objective:   /84   Pulse 61   Temp 98.3 °F (36.8 °C) (Oral)   Wt 169 lb (76.7 kg)   SpO2 97%   BMI 30.91 kg/m²   BP Readings from Last 3 Encounters:   11/08/21 130/84   04/30/21 116/76   10/19/20 124/70     Physical Exam  Vitals reviewed. Constitutional:       Appearance: She is well-developed. She is not toxic-appearing. HENT:      Head: Normocephalic. Neck:      Thyroid: No thyroid mass or thyromegaly. Cardiovascular:      Rate and Rhythm: Normal rate and regular rhythm. Heart sounds: Normal heart sounds. No murmur heard. Pulmonary:      Effort: No respiratory distress. Breath sounds: Normal breath sounds. No wheezing or rales. Chest:   Breasts:      Right: No supraclavicular adenopathy. Abdominal:      General: There is no distension. Palpations: Abdomen is soft. There is no mass. Tenderness: There is no abdominal tenderness. There is no guarding or rebound. Musculoskeletal:      Cervical back: Neck supple. Lymphadenopathy:      Cervical: No cervical adenopathy. Upper Body:      Right upper body: No supraclavicular adenopathy. Skin:     General: Skin is warm. Neurological:      Mental Status: She is alert and oriented to person, place, and time. Psychiatric:         Behavior: Behavior normal.         Thought Content: Thought content normal.         Judgment: Judgment normal.       Assessment and Plan:   Diagnosis Orders   1.  Essential hypertension  Basic Metabolic Panel   2. Pure hypercholesterolemia  Lipid Panel   3. Fatty liver  Hepatic Function Panel    CBC Auto Differential   4. Gastroesophageal reflux disease without esophagitis  esomeprazole (NEXIUM) 40 MG delayed release capsule   5. COVID-19  Covid-19, Antibody, Total     The problems listed in the assessment are stable unless otherwise indicated. She  was instructed to continue their current medications and treatment for the above problems unless otherwise indicated above. Age-specific preventative medicine recommendations were reviewed with patient today and the Healthy Family Handout was given to patient. Avoid tobacco products exposure. I have recommended that the patient follow CDC guidelines for prevention of COVID-19 infection. Follow up in 6mo. Call or return to office for any problems that develop before the next scheduled follow-up appointment. Jules Flores M.D. Parts of this note were completed using voice recognition transcription. Every effort was made to ensure accuracy; however, inadvertent transcription errors may be present.

## 2021-11-16 ENCOUNTER — TELEPHONE (OUTPATIENT)
Dept: FAMILY MEDICINE CLINIC | Age: 66
End: 2021-11-16

## 2021-11-16 DIAGNOSIS — K58.2 IRRITABLE BOWEL SYNDROME WITH BOTH CONSTIPATION AND DIARRHEA: ICD-10-CM

## 2021-11-16 RX ORDER — DICYCLOMINE HYDROCHLORIDE 10 MG/1
10 CAPSULE ORAL
Qty: 270 CAPSULE | Refills: 1 | Status: SHIPPED | OUTPATIENT
Start: 2021-11-16 | End: 2022-05-09 | Stop reason: ALTCHOICE

## 2021-12-15 RX ORDER — LOSARTAN POTASSIUM AND HYDROCHLOROTHIAZIDE 25; 100 MG/1; MG/1
TABLET ORAL
Qty: 90 TABLET | Refills: 1 | Status: SHIPPED | OUTPATIENT
Start: 2021-12-15 | End: 2022-06-06

## 2022-01-26 RX ORDER — ATENOLOL 25 MG/1
TABLET ORAL
Qty: 90 TABLET | Refills: 1 | Status: SHIPPED | OUTPATIENT
Start: 2022-01-26

## 2022-02-01 ENCOUNTER — TELEPHONE (OUTPATIENT)
Dept: FAMILY MEDICINE CLINIC | Age: 67
End: 2022-02-01

## 2022-02-01 RX ORDER — ONDANSETRON 4 MG/1
4 TABLET, FILM COATED ORAL 4 TIMES DAILY
Qty: 50 TABLET | Refills: 0 | Status: SHIPPED | OUTPATIENT
Start: 2022-02-01 | End: 2022-05-09 | Stop reason: ALTCHOICE

## 2022-02-01 NOTE — TELEPHONE ENCOUNTER
Patient has had vomiting and diarrhea since last night. No fever. WIll you prescribe something for the vomiting. Advise to drink,power ludy body armour or pedialyte. COuld you prescrib somethin for the vomiting.

## 2022-05-09 ENCOUNTER — OFFICE VISIT (OUTPATIENT)
Dept: FAMILY MEDICINE CLINIC | Age: 67
End: 2022-05-09
Payer: COMMERCIAL

## 2022-05-09 VITALS
WEIGHT: 174 LBS | OXYGEN SATURATION: 97 % | DIASTOLIC BLOOD PRESSURE: 84 MMHG | BODY MASS INDEX: 31.83 KG/M2 | HEART RATE: 61 BPM | SYSTOLIC BLOOD PRESSURE: 130 MMHG

## 2022-05-09 DIAGNOSIS — M72.2 PLANTAR FASCIITIS: ICD-10-CM

## 2022-05-09 DIAGNOSIS — E78.00 PURE HYPERCHOLESTEROLEMIA: ICD-10-CM

## 2022-05-09 DIAGNOSIS — K76.0 FATTY LIVER: ICD-10-CM

## 2022-05-09 DIAGNOSIS — L70.0 ACNE VULGARIS: ICD-10-CM

## 2022-05-09 DIAGNOSIS — K21.9 GASTROESOPHAGEAL REFLUX DISEASE WITHOUT ESOPHAGITIS: ICD-10-CM

## 2022-05-09 DIAGNOSIS — R74.8 ELEVATED LIVER ENZYMES: ICD-10-CM

## 2022-05-09 DIAGNOSIS — I10 ESSENTIAL HYPERTENSION: Primary | ICD-10-CM

## 2022-05-09 LAB
A/G RATIO: 1.6 (ref 1.1–2.2)
ALBUMIN SERPL-MCNC: 4.5 G/DL (ref 3.4–5)
ALP BLD-CCNC: 69 U/L (ref 40–129)
ALT SERPL-CCNC: 51 U/L (ref 10–40)
ANION GAP SERPL CALCULATED.3IONS-SCNC: 13 MMOL/L (ref 3–16)
AST SERPL-CCNC: 37 U/L (ref 15–37)
BILIRUB SERPL-MCNC: 0.8 MG/DL (ref 0–1)
BUN BLDV-MCNC: 14 MG/DL (ref 7–20)
CALCIUM SERPL-MCNC: 10.5 MG/DL (ref 8.3–10.6)
CHLORIDE BLD-SCNC: 101 MMOL/L (ref 99–110)
CHOLESTEROL, TOTAL: 207 MG/DL (ref 0–199)
CO2: 26 MMOL/L (ref 21–32)
CREAT SERPL-MCNC: 0.9 MG/DL (ref 0.6–1.2)
GFR AFRICAN AMERICAN: >60
GFR NON-AFRICAN AMERICAN: >60
GLUCOSE BLD-MCNC: 100 MG/DL (ref 70–99)
HDLC SERPL-MCNC: 43 MG/DL (ref 40–60)
LDL CHOLESTEROL CALCULATED: 137 MG/DL
POTASSIUM SERPL-SCNC: 4 MMOL/L (ref 3.5–5.1)
SODIUM BLD-SCNC: 140 MMOL/L (ref 136–145)
TOTAL PROTEIN: 7.3 G/DL (ref 6.4–8.2)
TRIGL SERPL-MCNC: 133 MG/DL (ref 0–150)
VLDLC SERPL CALC-MCNC: 27 MG/DL

## 2022-05-09 PROCEDURE — 36415 COLL VENOUS BLD VENIPUNCTURE: CPT | Performed by: FAMILY MEDICINE

## 2022-05-09 PROCEDURE — 99214 OFFICE O/P EST MOD 30 MIN: CPT | Performed by: FAMILY MEDICINE

## 2022-05-09 RX ORDER — DOXYCYCLINE HYCLATE 100 MG/1
100 CAPSULE ORAL DAILY
Qty: 90 CAPSULE | Refills: 0 | Status: SHIPPED | OUTPATIENT
Start: 2022-05-09 | End: 2022-05-23 | Stop reason: SDUPTHER

## 2022-05-09 RX ORDER — BIMATOPROST 0.01 %
DROPS OPHTHALMIC (EYE)
Qty: 7.5 ML | Refills: 3 | Status: SHIPPED | OUTPATIENT
Start: 2022-05-09 | End: 2022-05-23 | Stop reason: SDUPTHER

## 2022-05-09 ASSESSMENT — PATIENT HEALTH QUESTIONNAIRE - PHQ9
SUM OF ALL RESPONSES TO PHQ QUESTIONS 1-9: 2
1. LITTLE INTEREST OR PLEASURE IN DOING THINGS: 1
SUM OF ALL RESPONSES TO PHQ QUESTIONS 1-9: 2
SUM OF ALL RESPONSES TO PHQ9 QUESTIONS 1 & 2: 2
SUM OF ALL RESPONSES TO PHQ QUESTIONS 1-9: 2
2. FEELING DOWN, DEPRESSED OR HOPELESS: 1
SUM OF ALL RESPONSES TO PHQ QUESTIONS 1-9: 2

## 2022-05-09 NOTE — PROGRESS NOTES
She presents for follow up of her chronic health problems. She is having issues with acne again and wants to go back on the doxycycline her dermatologist used to have her on once a day. She also is complaining of plantar fasciitis issues. She did see the foot specialist back in December  Tests and documents reviewed: Last note and last lab work reviewed today     Objective:   /84   Pulse 61   Wt 174 lb (78.9 kg)   SpO2 97%   BMI 31.83 kg/m²   BP Readings from Last 3 Encounters:   05/09/22 130/84   11/08/21 130/84   04/30/21 116/76     Physical Exam  Vitals reviewed. Constitutional:       Appearance: She is well-developed. She is not toxic-appearing. HENT:      Head: Normocephalic. Neck:      Thyroid: No thyroid mass or thyromegaly. Cardiovascular:      Rate and Rhythm: Normal rate and regular rhythm. Heart sounds: Normal heart sounds. No murmur heard. Pulmonary:      Effort: No respiratory distress. Breath sounds: Normal breath sounds. No wheezing or rales. Chest:   Breasts:      Right: No supraclavicular adenopathy. Abdominal:      General: There is no distension. Palpations: Abdomen is soft. There is no mass. Tenderness: There is no abdominal tenderness. There is no guarding or rebound. Musculoskeletal:      Cervical back: Neck supple. Lymphadenopathy:      Cervical: No cervical adenopathy. Upper Body:      Right upper body: No supraclavicular adenopathy. Skin:     General: Skin is warm. Neurological:      Mental Status: She is alert and oriented to person, place, and time. Psychiatric:         Behavior: Behavior normal.         Thought Content: Thought content normal.         Judgment: Judgment normal.       Assessment and Plan:   Diagnosis Orders   1. Essential hypertension  Comprehensive Metabolic Panel   2. Fatty liver     3. Pure hypercholesterolemia  Lipid Panel   4. Gastroesophageal reflux disease without esophagitis     5.  Elevated liver enzymes     6. Acne vulgaris  doxycycline hyclate (VIBRAMYCIN) 100 MG capsule   7. Plantar fasciitis     Restart antibiotics for her acne issues. We discussed stretching and other treatments for her Planter fasciitis  The problems listed in the assessment are stable unless otherwise indicated. She  was instructed to continue their current medications and treatment for the above problems unless otherwise indicated above. Age-specific preventative medicine recommendations were reviewed with patient today and the Healthy Family Handout was given to patient. Avoid tobacco products exposure. I have recommended that the patient follow CDC guidelines for prevention of COVID-19 infection. Follow up in 6mo. Call or return to office for any problems that develop before the next scheduled follow-up appointment. Betty Sloan M.D. Parts of this note were completed using voice recognition transcription. Every effort was made to ensure accuracy; however, inadvertent transcription errors may be present.

## 2022-05-23 DIAGNOSIS — L70.0 ACNE VULGARIS: ICD-10-CM

## 2022-05-23 RX ORDER — BIMATOPROST 0.01 %
DROPS OPHTHALMIC (EYE)
Qty: 7.5 ML | Refills: 3 | Status: SHIPPED | OUTPATIENT
Start: 2022-05-23

## 2022-05-23 RX ORDER — DOXYCYCLINE HYCLATE 100 MG/1
100 CAPSULE ORAL DAILY
Qty: 90 CAPSULE | Refills: 0 | Status: SHIPPED | OUTPATIENT
Start: 2022-05-23 | End: 2022-06-22

## 2022-06-06 RX ORDER — LOSARTAN POTASSIUM AND HYDROCHLOROTHIAZIDE 25; 100 MG/1; MG/1
TABLET ORAL
Qty: 90 TABLET | Refills: 1 | Status: SHIPPED | OUTPATIENT
Start: 2022-06-06

## 2022-06-28 DIAGNOSIS — K21.9 GASTROESOPHAGEAL REFLUX DISEASE WITHOUT ESOPHAGITIS: ICD-10-CM

## 2022-06-28 RX ORDER — ESOMEPRAZOLE MAGNESIUM 40 MG/1
CAPSULE, DELAYED RELEASE ORAL
Qty: 90 CAPSULE | Refills: 2 | Status: SHIPPED | OUTPATIENT
Start: 2022-06-28

## 2022-06-28 NOTE — TELEPHONE ENCOUNTER
Future appt scheduled 11/21/2022                   Last appt 05/09/2022      Last Written  11/08/2021    esomeprazole (NEXIUM) 40 MG delayed release capsule  #90  2 RF

## 2022-11-21 ENCOUNTER — OFFICE VISIT (OUTPATIENT)
Dept: FAMILY MEDICINE CLINIC | Age: 67
End: 2022-11-21
Payer: MEDICARE

## 2022-11-21 VITALS
SYSTOLIC BLOOD PRESSURE: 122 MMHG | HEART RATE: 65 BPM | WEIGHT: 176 LBS | BODY MASS INDEX: 32.19 KG/M2 | DIASTOLIC BLOOD PRESSURE: 78 MMHG | OXYGEN SATURATION: 95 % | TEMPERATURE: 97.9 F

## 2022-11-21 DIAGNOSIS — E78.00 PURE HYPERCHOLESTEROLEMIA: ICD-10-CM

## 2022-11-21 DIAGNOSIS — Z12.11 COLON CANCER SCREENING: ICD-10-CM

## 2022-11-21 DIAGNOSIS — K21.9 GASTROESOPHAGEAL REFLUX DISEASE WITHOUT ESOPHAGITIS: ICD-10-CM

## 2022-11-21 DIAGNOSIS — K76.0 FATTY LIVER: ICD-10-CM

## 2022-11-21 DIAGNOSIS — I10 ESSENTIAL HYPERTENSION: Primary | ICD-10-CM

## 2022-11-21 DIAGNOSIS — L70.0 ACNE VULGARIS: ICD-10-CM

## 2022-11-21 LAB
A/G RATIO: 1.7 (ref 1.1–2.2)
ALBUMIN SERPL-MCNC: 4.5 G/DL (ref 3.4–5)
ALP BLD-CCNC: 91 U/L (ref 40–129)
ALT SERPL-CCNC: 82 U/L (ref 10–40)
ANION GAP SERPL CALCULATED.3IONS-SCNC: 15 MMOL/L (ref 3–16)
AST SERPL-CCNC: 55 U/L (ref 15–37)
BILIRUB SERPL-MCNC: 0.5 MG/DL (ref 0–1)
BUN BLDV-MCNC: 11 MG/DL (ref 7–20)
CALCIUM SERPL-MCNC: 10.1 MG/DL (ref 8.3–10.6)
CHLORIDE BLD-SCNC: 99 MMOL/L (ref 99–110)
CHOLESTEROL, TOTAL: 196 MG/DL (ref 0–199)
CO2: 26 MMOL/L (ref 21–32)
CREAT SERPL-MCNC: 0.8 MG/DL (ref 0.6–1.2)
GFR SERPL CREATININE-BSD FRML MDRD: >60 ML/MIN/{1.73_M2}
GLUCOSE BLD-MCNC: 105 MG/DL (ref 70–99)
HDLC SERPL-MCNC: 43 MG/DL (ref 40–60)
LDL CHOLESTEROL CALCULATED: 125 MG/DL
POTASSIUM SERPL-SCNC: 4 MMOL/L (ref 3.5–5.1)
SODIUM BLD-SCNC: 140 MMOL/L (ref 136–145)
TOTAL PROTEIN: 7.2 G/DL (ref 6.4–8.2)
TRIGL SERPL-MCNC: 138 MG/DL (ref 0–150)
VLDLC SERPL CALC-MCNC: 28 MG/DL

## 2022-11-21 PROCEDURE — 3078F DIAST BP <80 MM HG: CPT | Performed by: FAMILY MEDICINE

## 2022-11-21 PROCEDURE — 1123F ACP DISCUSS/DSCN MKR DOCD: CPT | Performed by: FAMILY MEDICINE

## 2022-11-21 PROCEDURE — 99214 OFFICE O/P EST MOD 30 MIN: CPT | Performed by: FAMILY MEDICINE

## 2022-11-21 PROCEDURE — 36415 COLL VENOUS BLD VENIPUNCTURE: CPT | Performed by: FAMILY MEDICINE

## 2022-11-21 PROCEDURE — 3074F SYST BP LT 130 MM HG: CPT | Performed by: FAMILY MEDICINE

## 2022-11-21 RX ORDER — DOXYCYCLINE HYCLATE 100 MG/1
100 CAPSULE ORAL DAILY
Qty: 90 CAPSULE | Refills: 0 | Status: SHIPPED | OUTPATIENT
Start: 2022-11-21 | End: 2022-12-21

## 2022-11-21 RX ORDER — SPIRONOLACTONE 25 MG/1
25 TABLET ORAL DAILY
Qty: 90 TABLET | Refills: 1 | Status: SHIPPED | OUTPATIENT
Start: 2022-11-21

## 2022-11-21 RX ORDER — BIMATOPROST 0.01 %
DROPS OPHTHALMIC (EYE)
Qty: 7.5 ML | Refills: 3 | Status: SHIPPED | OUTPATIENT
Start: 2022-11-21

## 2022-11-21 NOTE — PROGRESS NOTES
3. Fatty liver        4. Acne vulgaris  doxycycline hyclate (VIBRAMYCIN) 100 MG capsule    spironolactone (ALDACTONE) 25 MG tablet      5. Gastroesophageal reflux disease without esophagitis        6. Colon cancer screening  POCT Fecal Immunochemical Test (FIT)      We will try low-dose Aldactone. I told her not to take any over-the-counter potassium. We will check potassium level today and will need to monitor since she is already on ARB with HCTZ I also encouraged her to discuss with the dermatologist in January. The problems listed in the assessment are stable unless otherwise indicated. Prescription drug management completed today. She  was instructed to continue their current medications and treatment for the above problems unless otherwise indicated above. Age-specific preventative medicine recommendations were reviewed with patient today. Follow up in 6mo. Call or return to office for any problems that develop before the next scheduled follow-up appointment. Yfn Lock M.D. Parts of this note were completed using voice recognition transcription. Every effort was made to ensure accuracy; however, inadvertent transcription errors may be present.

## 2022-12-05 ENCOUNTER — TELEPHONE (OUTPATIENT)
Dept: FAMILY MEDICINE CLINIC | Age: 67
End: 2022-12-05

## 2022-12-05 RX ORDER — AMOXICILLIN 500 MG/1
1000 CAPSULE ORAL 2 TIMES DAILY
Qty: 40 CAPSULE | Refills: 0 | Status: SHIPPED | OUTPATIENT
Start: 2022-12-05 | End: 2022-12-15

## 2022-12-05 NOTE — TELEPHONE ENCOUNTER
Pt called complaining of sinus drainage, no color, minor cough from tickle in throat, facial pain x 1 week. Sxs are getting worse today. Pt took otc coricidin that gave very little relief, but just not going away. Pt denies fever. Pt states she uses [facility] in [place]. Please advise.  Thank you

## 2023-03-27 DIAGNOSIS — K21.9 GASTROESOPHAGEAL REFLUX DISEASE WITHOUT ESOPHAGITIS: ICD-10-CM

## 2023-03-27 RX ORDER — ESOMEPRAZOLE MAGNESIUM 40 MG/1
40 CAPSULE, DELAYED RELEASE ORAL
Qty: 90 CAPSULE | Refills: 2 | Status: SHIPPED | OUTPATIENT
Start: 2023-03-27 | End: 2023-06-25

## 2023-05-22 ENCOUNTER — OFFICE VISIT (OUTPATIENT)
Dept: FAMILY MEDICINE CLINIC | Age: 68
End: 2023-05-22

## 2023-05-22 VITALS
HEART RATE: 66 BPM | SYSTOLIC BLOOD PRESSURE: 130 MMHG | OXYGEN SATURATION: 94 % | BODY MASS INDEX: 32.94 KG/M2 | HEIGHT: 62 IN | DIASTOLIC BLOOD PRESSURE: 76 MMHG | WEIGHT: 179 LBS | TEMPERATURE: 98.1 F

## 2023-05-22 DIAGNOSIS — Z12.31 ENCOUNTER FOR SCREENING MAMMOGRAM FOR BREAST CANCER: ICD-10-CM

## 2023-05-22 DIAGNOSIS — E78.00 PURE HYPERCHOLESTEROLEMIA: ICD-10-CM

## 2023-05-22 DIAGNOSIS — K21.9 GASTROESOPHAGEAL REFLUX DISEASE WITHOUT ESOPHAGITIS: ICD-10-CM

## 2023-05-22 DIAGNOSIS — R79.89 ELEVATED LFTS: ICD-10-CM

## 2023-05-22 DIAGNOSIS — I10 ESSENTIAL HYPERTENSION: Primary | ICD-10-CM

## 2023-05-22 DIAGNOSIS — K76.0 FATTY LIVER: ICD-10-CM

## 2023-05-22 LAB
ALBUMIN SERPL-MCNC: 4.5 G/DL (ref 3.4–5)
ALP SERPL-CCNC: 95 U/L (ref 40–129)
ALT SERPL-CCNC: 103 U/L (ref 10–40)
ANION GAP SERPL CALCULATED.3IONS-SCNC: 13 MMOL/L (ref 3–16)
AST SERPL-CCNC: 67 U/L (ref 15–37)
BILIRUB DIRECT SERPL-MCNC: <0.2 MG/DL (ref 0–0.3)
BILIRUB INDIRECT SERPL-MCNC: ABNORMAL MG/DL (ref 0–1)
BILIRUB SERPL-MCNC: 0.8 MG/DL (ref 0–1)
BUN SERPL-MCNC: 12 MG/DL (ref 7–20)
CALCIUM SERPL-MCNC: 10.2 MG/DL (ref 8.3–10.6)
CHLORIDE SERPL-SCNC: 99 MMOL/L (ref 99–110)
CHOLEST SERPL-MCNC: 190 MG/DL (ref 0–199)
CO2 SERPL-SCNC: 26 MMOL/L (ref 21–32)
CREAT SERPL-MCNC: 0.8 MG/DL (ref 0.6–1.2)
DEPRECATED RDW RBC AUTO: 13.5 % (ref 12.4–15.4)
GFR SERPLBLD CREATININE-BSD FMLA CKD-EPI: >60 ML/MIN/{1.73_M2}
GLUCOSE SERPL-MCNC: 102 MG/DL (ref 70–99)
HCT VFR BLD AUTO: 42.9 % (ref 36–48)
HDLC SERPL-MCNC: 40 MG/DL (ref 40–60)
HGB BLD-MCNC: 14.6 G/DL (ref 12–16)
LDLC SERPL CALC-MCNC: 114 MG/DL
MCH RBC QN AUTO: 30.2 PG (ref 26–34)
MCHC RBC AUTO-ENTMCNC: 34 G/DL (ref 31–36)
MCV RBC AUTO: 88.9 FL (ref 80–100)
PLATELET # BLD AUTO: 195 K/UL (ref 135–450)
PMV BLD AUTO: 9.6 FL (ref 5–10.5)
POTASSIUM SERPL-SCNC: 4.1 MMOL/L (ref 3.5–5.1)
PROT SERPL-MCNC: 7.4 G/DL (ref 6.4–8.2)
RBC # BLD AUTO: 4.82 M/UL (ref 4–5.2)
SODIUM SERPL-SCNC: 138 MMOL/L (ref 136–145)
TRIGL SERPL-MCNC: 180 MG/DL (ref 0–150)
VLDLC SERPL CALC-MCNC: 36 MG/DL
WBC # BLD AUTO: 7.1 K/UL (ref 4–11)

## 2023-05-22 RX ORDER — ATENOLOL 25 MG/1
25 TABLET ORAL DAILY
Qty: 90 TABLET | Refills: 3 | Status: SHIPPED | OUTPATIENT
Start: 2023-05-22

## 2023-05-22 RX ORDER — LOSARTAN POTASSIUM AND HYDROCHLOROTHIAZIDE 25; 100 MG/1; MG/1
1 TABLET ORAL DAILY
Qty: 90 TABLET | Refills: 3 | Status: SHIPPED | OUTPATIENT
Start: 2023-05-22

## 2023-05-22 ASSESSMENT — PATIENT HEALTH QUESTIONNAIRE - PHQ9
1. LITTLE INTEREST OR PLEASURE IN DOING THINGS: 0
SUM OF ALL RESPONSES TO PHQ QUESTIONS 1-9: 0
2. FEELING DOWN, DEPRESSED OR HOPELESS: 0
SUM OF ALL RESPONSES TO PHQ9 QUESTIONS 1 & 2: 0

## 2023-05-22 NOTE — PROGRESS NOTES
She presents today for follow-up of high blood pressure high cholesterol fatty liver disease with elevated liver enzymes and GERD. On review of her blood work from last time her liver enzymes were elevated. She is due for mammogram and last colonoscopy was 2016. She had work-up by GI in 2020 including FibroScan which showed mild fibrosis at that time. He felt that she had fatty liver disease  Tests and documents reviewed today: Lab work     Objective:   /76   Pulse 66   Temp 98.1 °F (36.7 °C) (Oral)   Ht 5' 2\" (1.575 m)   Wt 179 lb (81.2 kg)   SpO2 94%   BMI 32.74 kg/m²   BP Readings from Last 3 Encounters:   05/22/23 130/76   11/21/22 122/78   05/09/22 130/84     Physical Exam  Vitals reviewed. Constitutional:       Appearance: She is well-developed. She is not toxic-appearing. HENT:      Head: Normocephalic. Neck:      Thyroid: No thyroid mass or thyromegaly. Cardiovascular:      Rate and Rhythm: Normal rate and regular rhythm. Heart sounds: Normal heart sounds. No murmur heard. Pulmonary:      Effort: No respiratory distress. Breath sounds: Normal breath sounds. No wheezing or rales. Abdominal:      General: There is no distension. Palpations: Abdomen is soft. There is no mass. Tenderness: There is no abdominal tenderness. There is no guarding or rebound. Musculoskeletal:      Cervical back: Neck supple. Lymphadenopathy:      Cervical: No cervical adenopathy. Upper Body:      Right upper body: No supraclavicular adenopathy. Skin:     General: Skin is warm. Neurological:      Mental Status: She is alert and oriented to person, place, and time. Psychiatric:         Behavior: Behavior normal.         Thought Content: Thought content normal.         Judgment: Judgment normal.     Assessment and Plan:   Diagnosis Orders   1.  Essential hypertension  Basic Metabolic Panel    losartan-hydroCHLOROthiazide (HYZAAR) 100-25 MG per tablet    atenolol (TENORMIN) 25

## 2023-05-22 NOTE — PATIENT INSTRUCTIONS
Please read the healthy family handout that you were given and share it with your family. Please compare this printed medication list with your medications at home to be sure they are the same. If you have any medications that are different please contact us immediately at [phone]. Also review your allergies that we have listed, these may also include medications that you have not been able to tolerate, make sure everything listed is correct. If you have any allergies that are different please contact us immediately at [phone]. You may receive a survey in the mail or by email asking about your experience during your visit today. Please complete and return to us so we know how we are serving you.

## 2023-05-23 ENCOUNTER — COMMUNITY OUTREACH (OUTPATIENT)
Dept: FAMILY MEDICINE CLINIC | Age: 68
End: 2023-05-23

## 2023-05-23 NOTE — PROGRESS NOTES
Patient's  shows they are overdue for Colonoscopy, Mammogram   CareEverywhere and  files searched  without success.

## 2023-05-25 DIAGNOSIS — R79.89 ELEVATED LFTS: Primary | ICD-10-CM

## 2023-06-05 ENCOUNTER — NURSE ONLY (OUTPATIENT)
Dept: FAMILY MEDICINE CLINIC | Age: 68
End: 2023-06-05
Payer: MEDICARE

## 2023-06-05 DIAGNOSIS — Z12.11 COLON CANCER SCREENING: ICD-10-CM

## 2023-06-05 LAB
CONTROL: NORMAL
HEMOCCULT STL QL: NEGATIVE

## 2023-06-05 PROCEDURE — 82274 ASSAY TEST FOR BLOOD FECAL: CPT | Performed by: FAMILY MEDICINE

## 2023-06-08 ENCOUNTER — TELEMEDICINE (OUTPATIENT)
Dept: FAMILY MEDICINE CLINIC | Age: 68
End: 2023-06-08

## 2023-06-08 DIAGNOSIS — Z00.00 INITIAL MEDICARE ANNUAL WELLNESS VISIT: Primary | ICD-10-CM

## 2023-06-08 SDOH — ECONOMIC STABILITY: INCOME INSECURITY: HOW HARD IS IT FOR YOU TO PAY FOR THE VERY BASICS LIKE FOOD, HOUSING, MEDICAL CARE, AND HEATING?: NOT HARD AT ALL

## 2023-06-08 SDOH — ECONOMIC STABILITY: FOOD INSECURITY: WITHIN THE PAST 12 MONTHS, YOU WORRIED THAT YOUR FOOD WOULD RUN OUT BEFORE YOU GOT MONEY TO BUY MORE.: NEVER TRUE

## 2023-06-08 SDOH — ECONOMIC STABILITY: FOOD INSECURITY: WITHIN THE PAST 12 MONTHS, THE FOOD YOU BOUGHT JUST DIDN'T LAST AND YOU DIDN'T HAVE MONEY TO GET MORE.: NEVER TRUE

## 2023-06-08 SDOH — ECONOMIC STABILITY: HOUSING INSECURITY
IN THE LAST 12 MONTHS, WAS THERE A TIME WHEN YOU DID NOT HAVE A STEADY PLACE TO SLEEP OR SLEPT IN A SHELTER (INCLUDING NOW)?: NO

## 2023-06-08 ASSESSMENT — PATIENT HEALTH QUESTIONNAIRE - PHQ9
SUM OF ALL RESPONSES TO PHQ9 QUESTIONS 1 & 2: 0
SUM OF ALL RESPONSES TO PHQ QUESTIONS 1-9: 0
2. FEELING DOWN, DEPRESSED OR HOPELESS: 0
1. LITTLE INTEREST OR PLEASURE IN DOING THINGS: 0
SUM OF ALL RESPONSES TO PHQ QUESTIONS 1-9: 0

## 2023-06-08 ASSESSMENT — LIFESTYLE VARIABLES
HOW OFTEN DO YOU HAVE A DRINK CONTAINING ALCOHOL: NEVER
HOW MANY STANDARD DRINKS CONTAINING ALCOHOL DO YOU HAVE ON A TYPICAL DAY: PATIENT DOES NOT DRINK

## 2023-06-08 NOTE — PATIENT INSTRUCTIONS
Personalized Preventive Plan for Darin Blackwell - 6/8/2023  Medicare offers a range of preventive health benefits. Some of the tests and screenings are paid in full while other may be subject to a deductible, co-insurance, and/or copay. Some of these benefits include a comprehensive review of your medical history including lifestyle, illnesses that may run in your family, and various assessments and screenings as appropriate. After reviewing your medical record and screening and assessments performed today your provider may have ordered immunizations, labs, imaging, and/or referrals for you. A list of these orders (if applicable) as well as your Preventive Care list are included within your After Visit Summary for your review. Other Preventive Recommendations:    A preventive eye exam performed by an eye specialist is recommended every 1-2 years to screen for glaucoma; cataracts, macular degeneration, and other eye disorders. A preventive dental visit is recommended every 6 months. Try to get at least 150 minutes of exercise per week or 10,000 steps per day on a pedometer . Order or download the FREE \"Exercise & Physical Activity: Your Everyday Guide\" from [employer]. Call 9-546.511.3369 or search [employer] online. You need 3442-6200 mg of calcium and 7435-5524 IU of vitamin D per day. It is possible to meet your calcium requirement with diet alone, but a vitamin D supplement is usually necessary to meet this goal.  When exposed to the sun, use a sunscreen that protects against both UVA and UVB radiation with an SPF of 30 or greater. Reapply every 2 to 3 hours or after sweating, drying off with a towel, or swimming. Always wear a seat belt when traveling in a car. Always wear a helmet when riding a bicycle or motorcycle.

## 2023-06-08 NOTE — PROGRESS NOTES
Medicare Annual Wellness Visit    Ricky Bernabe is here for Medicare Lu Singer   Initial Medicare annual wellness visit  Recommendations for Preventive Services Due: see orders and patient instructions/AVS.  Recommended screening schedule for the next 5-10 years is provided to the patient in written form: see Patient Instructions/AVS.     No follow-ups on file. Subjective   Patient's complete Health Risk Assessment and screening values have been reviewed and are found in Flowsheets. The following problems were reviewed today and where indicated follow up appointments were made and/or referrals ordered. 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 (!) Abnormal           Safety:  Do you always fasten your seatbelt when you are in a car?: (!) No (Not always but about 95% of the time.)           Objective      Patient-Reported Vitals  Patient-Reported Systolic (Top): 074 mmHg  Patient-Reported Diastolic (Bottom): 85 mmHg  Patient-Reported Pulse: 66  Patient-Reported Weight: 170 lbs       Allergies   Allergen Reactions    Pseudoephedrine Hcl     Sudafed [Pseudoephedrine Hcl]      Prior to Visit Medications    Medication Sig Taking?  Authorizing Provider   metroNIDAZOLE (METROCREAM) 0.75 % cream  Yes Historical Provider, Myra Grajeda, (SAW PALMETTO PO) Take by mouth Yes Historical Provider, MD   Multiple Vitamin (MULTIVITAMIN ADULT PO) Take by mouth Yes Historical Provider, MD   CINNAMON PO Take by mouth Yes Historical Provider, MD

## 2023-09-05 ENCOUNTER — HOSPITAL ENCOUNTER (OUTPATIENT)
Dept: MAMMOGRAPHY | Age: 68
Discharge: HOME | End: 2023-09-05
Payer: MEDICARE

## 2023-09-05 VITALS — HEIGHT: 62 IN | BODY MASS INDEX: 32.2 KG/M2 | WEIGHT: 175 LBS

## 2023-09-05 DIAGNOSIS — Z12.31 VISIT FOR SCREENING MAMMOGRAM: ICD-10-CM

## 2023-09-05 PROCEDURE — 77063 BREAST TOMOSYNTHESIS BI: CPT

## 2023-12-05 ENCOUNTER — TELEPHONE (OUTPATIENT)
Dept: FAMILY MEDICINE CLINIC | Age: 68
End: 2023-12-05

## 2023-12-05 DIAGNOSIS — I10 ESSENTIAL HYPERTENSION: ICD-10-CM

## 2023-12-05 RX ORDER — ATENOLOL 25 MG/1
25 TABLET ORAL 2 TIMES DAILY
COMMUNITY
Start: 2023-12-05

## 2023-12-05 NOTE — TELEPHONE ENCOUNTER
Patient states she has been having increased bp readings for the past week. She ended up going to [facility] [facility] last night her bp was 169/110. She said bp will fluctuate and she has had some readings as low as 130/85 and as high as 178/101. She states prior to going to ER she felt some tingling in face and fingertips and said her head feels a little funny. She currently takes Losartan/hctz 100/25 mg qam and Atenolol 25 mg nightly. She was prescribed clonidine at the ER to take as needed if systolic is over 496 and diastolic is over 790.   She wanted to know if she needed to just monitor bp for now and call with update or if she needs to come in for hosp fu

## 2024-01-02 ENCOUNTER — OFFICE VISIT (OUTPATIENT)
Dept: FAMILY MEDICINE CLINIC | Age: 69
End: 2024-01-02
Payer: MEDICARE

## 2024-01-02 VITALS
SYSTOLIC BLOOD PRESSURE: 131 MMHG | DIASTOLIC BLOOD PRESSURE: 81 MMHG | HEART RATE: 63 BPM | BODY MASS INDEX: 31.83 KG/M2 | OXYGEN SATURATION: 95 % | WEIGHT: 174 LBS | TEMPERATURE: 98 F

## 2024-01-02 DIAGNOSIS — K76.0 FATTY LIVER: ICD-10-CM

## 2024-01-02 DIAGNOSIS — R42 EPISODIC LIGHTHEADEDNESS: ICD-10-CM

## 2024-01-02 DIAGNOSIS — E78.00 PURE HYPERCHOLESTEROLEMIA: ICD-10-CM

## 2024-01-02 DIAGNOSIS — K21.9 GASTROESOPHAGEAL REFLUX DISEASE WITHOUT ESOPHAGITIS: ICD-10-CM

## 2024-01-02 DIAGNOSIS — R79.89 ELEVATED TSH: ICD-10-CM

## 2024-01-02 DIAGNOSIS — I10 ESSENTIAL HYPERTENSION: Primary | ICD-10-CM

## 2024-01-02 PROCEDURE — 1123F ACP DISCUSS/DSCN MKR DOCD: CPT | Performed by: FAMILY MEDICINE

## 2024-01-02 PROCEDURE — 36415 COLL VENOUS BLD VENIPUNCTURE: CPT | Performed by: FAMILY MEDICINE

## 2024-01-02 PROCEDURE — 99214 OFFICE O/P EST MOD 30 MIN: CPT | Performed by: FAMILY MEDICINE

## 2024-01-02 PROCEDURE — 3079F DIAST BP 80-89 MM HG: CPT | Performed by: FAMILY MEDICINE

## 2024-01-02 PROCEDURE — 3075F SYST BP GE 130 - 139MM HG: CPT | Performed by: FAMILY MEDICINE

## 2024-01-02 RX ORDER — ESOMEPRAZOLE MAGNESIUM 40 MG/1
40 CAPSULE, DELAYED RELEASE ORAL
Qty: 90 CAPSULE | Refills: 2 | Status: SHIPPED | OUTPATIENT
Start: 2024-01-02 | End: 2024-09-28

## 2024-01-02 NOTE — PROGRESS NOTES
She presents today for routine follow-up of hypertension high cholesterol fatty liver disease and GERD.  Recently she has been having problems with sleep and decreased appetite and muscle cramping for 5 to 6 months.  She is back on a different curve which is supposed to help her liver she was out of it and her enzymes are up she is hoping they are better now.  She is complaining of lightheadedness at times and is worried about her carotid arteries.  She is back on atenolol 25 mg once a day and on her Hyzaar and her home blood pressures are better.  She has not used any more clonidine the ER gave her  Tests and documents reviewed today: last note and labs done in ER 12/5/23     Objective:   /81   Pulse 63   Temp 98 °F (36.7 °C) (Oral)   Wt 78.9 kg (174 lb)   SpO2 95%   BMI 31.83 kg/m²   BP Readings from Last 3 Encounters:   01/02/24 131/81   05/22/23 130/76   11/21/22 122/78     Physical Exam  Vitals reviewed.   Constitutional:       Appearance: She is well-developed. She is not toxic-appearing.   HENT:      Head: Normocephalic.   Neck:      Thyroid: No thyroid mass or thyromegaly.   Cardiovascular:      Rate and Rhythm: Normal rate and regular rhythm.      Heart sounds: Normal heart sounds. No murmur heard.  Pulmonary:      Effort: No respiratory distress.      Breath sounds: Normal breath sounds. No wheezing or rales.   Abdominal:      General: There is no distension.      Palpations: Abdomen is soft. There is no mass.      Tenderness: There is no abdominal tenderness. There is no guarding or rebound.   Musculoskeletal:      Cervical back: Neck supple.   Lymphadenopathy:      Cervical: No cervical adenopathy.      Upper Body:      Right upper body: No supraclavicular adenopathy.   Skin:     General: Skin is warm.   Neurological:      Mental Status: She is alert and oriented to person, place, and time.   Psychiatric:         Behavior: Behavior normal.         Thought Content: Thought content normal.

## 2024-01-02 NOTE — PATIENT INSTRUCTIONS
Please read the healthy family handout that you were given and share it with your family.       Please compare this printed medication list with your medications at home to be sure they are the same.  If you have any medications that are different please contact us immediately at [phone].     Also review your allergies that we have listed, these may also include medications that you have not been able to tolerate, make sure everything listed is correct. If you have any allergies that are different please contact us immediately at [phone].     You may receive a survey in the mail or by email asking about your experience during your visit today. Please complete and return to us so we know how we are serving you.

## 2024-01-03 PROBLEM — R79.89 ELEVATED TSH: Status: ACTIVE | Noted: 2024-01-03

## 2024-01-03 LAB
ALBUMIN SERPL-MCNC: 4.8 G/DL (ref 3.4–5)
ALP SERPL-CCNC: 87 U/L (ref 40–129)
ALT SERPL-CCNC: 106 U/L (ref 10–40)
ANION GAP SERPL CALCULATED.3IONS-SCNC: 13 MMOL/L (ref 3–16)
AST SERPL-CCNC: 81 U/L (ref 15–37)
BILIRUB DIRECT SERPL-MCNC: <0.2 MG/DL (ref 0–0.3)
BILIRUB INDIRECT SERPL-MCNC: ABNORMAL MG/DL (ref 0–1)
BILIRUB SERPL-MCNC: 1 MG/DL (ref 0–1)
BUN SERPL-MCNC: 10 MG/DL (ref 7–20)
CALCIUM SERPL-MCNC: 10 MG/DL (ref 8.3–10.6)
CHLORIDE SERPL-SCNC: 96 MMOL/L (ref 99–110)
CHOLEST SERPL-MCNC: 217 MG/DL (ref 0–199)
CO2 SERPL-SCNC: 26 MMOL/L (ref 21–32)
CREAT SERPL-MCNC: 0.8 MG/DL (ref 0.6–1.2)
GFR SERPLBLD CREATININE-BSD FMLA CKD-EPI: >60 ML/MIN/{1.73_M2}
GLUCOSE SERPL-MCNC: 90 MG/DL (ref 70–99)
HDLC SERPL-MCNC: 41 MG/DL (ref 40–60)
LDLC SERPL CALC-MCNC: 136 MG/DL
MAGNESIUM SERPL-MCNC: 2 MG/DL (ref 1.8–2.4)
POTASSIUM SERPL-SCNC: 4.4 MMOL/L (ref 3.5–5.1)
PROT SERPL-MCNC: 7.7 G/DL (ref 6.4–8.2)
SODIUM SERPL-SCNC: 135 MMOL/L (ref 136–145)
T4 FREE SERPL-MCNC: 1.3 NG/DL (ref 0.9–1.8)
TRIGL SERPL-MCNC: 200 MG/DL (ref 0–150)
TSH SERPL DL<=0.005 MIU/L-ACNC: 4.32 UIU/ML (ref 0.27–4.2)
VLDLC SERPL CALC-MCNC: 40 MG/DL

## 2024-01-04 DIAGNOSIS — K76.0 FATTY LIVER: Primary | ICD-10-CM

## 2024-01-04 DIAGNOSIS — R74.8 ELEVATED LIVER ENZYMES: ICD-10-CM

## 2024-01-10 ENCOUNTER — HOSPITAL ENCOUNTER (OUTPATIENT)
Dept: ULTRASOUND IMAGING | Age: 69
Discharge: HOME | End: 2024-01-10
Attending: FAMILY MEDICINE
Payer: MEDICARE

## 2024-01-10 DIAGNOSIS — R74.8 ELEVATED LIVER ENZYMES: ICD-10-CM

## 2024-01-10 DIAGNOSIS — K76.0 FATTY LIVER: ICD-10-CM

## 2024-01-10 PROCEDURE — 76705 ECHO EXAM OF ABDOMEN: CPT

## 2024-01-10 PROCEDURE — 76982 USE 1ST TARGET LESION: CPT

## 2024-01-15 DIAGNOSIS — R42 EPISODIC LIGHTHEADEDNESS: ICD-10-CM

## 2024-01-23 ENCOUNTER — TELEPHONE (OUTPATIENT)
Dept: FAMILY MEDICINE CLINIC | Age: 69
End: 2024-01-23

## 2024-02-27 ENCOUNTER — TELEPHONE (OUTPATIENT)
Dept: FAMILY MEDICINE CLINIC | Age: 69
End: 2024-02-27

## 2024-02-27 NOTE — TELEPHONE ENCOUNTER
----- Message from Patricia Sanford sent at 2/27/2024  9:49 AM EST -----  Subject: Appointment Request    Reason for Call: Established Patient Appointment needed: Routine Medicare   AWV    QUESTIONS    Reason for appointment request? No appointments available during search     Additional Information for Provider? Pt needs an appt. for annual welneess   Please call with availability   ---------------------------------------------------------------------------  --------------  CALL BACK INFO  [phone]; OK to leave message on voicemail  ---------------------------------------------------------------------------  --------------  SCRIPT ANSWERS

## 2024-02-27 NOTE — TELEPHONE ENCOUNTER
([facility]?) recommended this test.  He had done some testing on her and one of the tests is positive and that test points towards Lupus.    He told her that he doesn't follow for that and requests the PCP order it and follow.   The OV note is under Media tab.  I printed and put on your desk.

## 2024-02-27 NOTE — TELEPHONE ENCOUNTER
----- Message from Patricia Sanford sent at 2/27/2024  9:45 AM EST -----  Subject: Referral Request    Reason for referral request? Pt needs an order for a HORACE test to test for   Lupus.  recommended this test. Dr Kay is sending test results   to Dr. Dee  Provider patient wants to be referred to(if known):     Provider Phone Number(if known):    Additional Information for Provider?   ---------------------------------------------------------------------------  --------------  CALL BACK INFO    [phone]; OK to leave message on voicemail  ---------------------------------------------------------------------------  --------------

## 2024-02-27 NOTE — TELEPHONE ENCOUNTER
I reviewed his note and saw all of the labs he ordered.  I need a copy of the actual lab results he ordered

## 2024-02-27 NOTE — TELEPHONE ENCOUNTER
Patient just had her 6-month routine follow-up in January.  Does she just need a lab test?  Was the HORACE already done by the specialist or is the specialist requesting HORACE?

## 2024-02-29 NOTE — TELEPHONE ENCOUNTER
Dr Dee, lab results are scanned in chart now in media if you could review these and let me know what labs she needs to have ordered, thanks

## 2024-05-28 ENCOUNTER — TELEPHONE (OUTPATIENT)
Dept: FAMILY MEDICINE CLINIC | Age: 69
End: 2024-05-28

## 2024-05-28 NOTE — TELEPHONE ENCOUNTER
Left detailed message on her voicemail.  Asked her to call back to let us know that she received and understood the message.

## 2024-05-28 NOTE — TELEPHONE ENCOUNTER
was in.   Pt was seen in the ER at [facility] yesterday for syncope.   He brought in the D/C Instructions.   I called Alysha in MR Dept and she will fax all the test results, etc.   He said that she feels back to normal today.   On D/C it states to follow up with family doc today.   He said he'll leave it up to you as to whether she needs seen or not.   He is concerned that her blood work (sodium,potassium) may have been off but was told blood work was normal.  Pls advise on any follow up instructions.    Call [phone] (Masha's Number)

## 2024-05-28 NOTE — TELEPHONE ENCOUNTER
Please tell patient her testing was normal at ER. I reviewed the notes. Have drink plenty of water and follow up if any further problems

## 2024-06-18 DIAGNOSIS — I10 ESSENTIAL HYPERTENSION: ICD-10-CM

## 2024-06-18 RX ORDER — ATENOLOL 25 MG/1
25 TABLET ORAL DAILY
Qty: 90 TABLET | Refills: 1 | Status: SHIPPED | OUTPATIENT
Start: 2024-06-18

## 2024-06-18 RX ORDER — LOSARTAN POTASSIUM AND HYDROCHLOROTHIAZIDE 25; 100 MG/1; MG/1
1 TABLET ORAL DAILY
Qty: 90 TABLET | Refills: 1 | Status: SHIPPED | OUTPATIENT
Start: 2024-06-18

## 2024-07-12 ENCOUNTER — OFFICE VISIT (OUTPATIENT)
Dept: FAMILY MEDICINE CLINIC | Age: 69
End: 2024-07-12

## 2024-07-12 VITALS
WEIGHT: 150.2 LBS | SYSTOLIC BLOOD PRESSURE: 144 MMHG | HEART RATE: 74 BPM | BODY MASS INDEX: 27.64 KG/M2 | TEMPERATURE: 98.4 F | HEIGHT: 62 IN | DIASTOLIC BLOOD PRESSURE: 82 MMHG | OXYGEN SATURATION: 96 %

## 2024-07-12 DIAGNOSIS — B02.9 HERPES ZOSTER WITHOUT COMPLICATION: Primary | ICD-10-CM

## 2024-07-12 RX ORDER — ACYCLOVIR 800 MG/1
800 TABLET ORAL
Qty: 35 TABLET | Refills: 0 | Status: SHIPPED | OUTPATIENT
Start: 2024-07-12 | End: 2024-07-19

## 2024-07-12 SDOH — ECONOMIC STABILITY: FOOD INSECURITY: WITHIN THE PAST 12 MONTHS, THE FOOD YOU BOUGHT JUST DIDN'T LAST AND YOU DIDN'T HAVE MONEY TO GET MORE.: NEVER TRUE

## 2024-07-12 SDOH — ECONOMIC STABILITY: FOOD INSECURITY: WITHIN THE PAST 12 MONTHS, YOU WORRIED THAT YOUR FOOD WOULD RUN OUT BEFORE YOU GOT MONEY TO BUY MORE.: NEVER TRUE

## 2024-07-12 SDOH — ECONOMIC STABILITY: INCOME INSECURITY: HOW HARD IS IT FOR YOU TO PAY FOR THE VERY BASICS LIKE FOOD, HOUSING, MEDICAL CARE, AND HEATING?: NOT HARD AT ALL

## 2024-07-12 ASSESSMENT — PATIENT HEALTH QUESTIONNAIRE - PHQ9
SUM OF ALL RESPONSES TO PHQ9 QUESTIONS 1 & 2: 0
1. LITTLE INTEREST OR PLEASURE IN DOING THINGS: NOT AT ALL
SUM OF ALL RESPONSES TO PHQ QUESTIONS 1-9: 0
2. FEELING DOWN, DEPRESSED OR HOPELESS: NOT AT ALL
SUM OF ALL RESPONSES TO PHQ QUESTIONS 1-9: 0

## 2024-07-12 ASSESSMENT — ENCOUNTER SYMPTOMS
GASTROINTESTINAL NEGATIVE: 1
RESPIRATORY NEGATIVE: 1
SHORTNESS OF BREATH: 0
CHEST TIGHTNESS: 0
COUGH: 0
EYES NEGATIVE: 1
ALLERGIC/IMMUNOLOGIC NEGATIVE: 1

## 2024-07-12 NOTE — PROGRESS NOTES
Subjective:      Patient ID: Masha Forrester is a 68 y.o. female.    Chief Complaint   Patient presents with    Other     Pt believes she may have shingles. She has a red patch on the left side of her chest. Pt says there were blisters yesterday morning, but they have since went away. She also reports that they may be beginning to come up on her right lower back as well.         HPI    Patient presents today with concerns of shingles.    Rash: Masha Forrester is a 68 y.o. female who presents with a 2 day history of a localized rash.  Rash first presented on the  left chest  and has not spread.  Rash is pink and is pruritic and painful.  Associated symptoms include  as previously noted. Patient denies fever, chills, or myalgia .  Patient has tried nothing.  New exposures: none.  Patient has not had contacts with similar symptoms.  Prior history of similar symptoms: yes - patient reports 3 prior episodes of shingles.       Review of Systems   Constitutional: Negative.  Negative for activity change, appetite change, chills, fever and unexpected weight change.   HENT: Negative.  Negative for sneezing.    Eyes: Negative.    Respiratory: Negative.  Negative for cough, chest tightness and shortness of breath.    Cardiovascular: Negative.  Negative for chest pain, palpitations and leg swelling.   Gastrointestinal: Negative.    Endocrine: Negative.    Genitourinary: Negative.    Skin:  Positive for rash.   Allergic/Immunologic: Negative.    Neurological:  Negative for dizziness and headaches.   Psychiatric/Behavioral: Negative.         Patient Active Problem List   Diagnosis    Acne    Pure hypercholesterolemia    Essential hypertension    Gastroesophageal reflux disease    Irritable bowel syndrome with both constipation and diarrhea    Elevated liver enzymes    Fatty liver    Plantar fasciitis    Elevated TSH       No outpatient medications have been marked as taking for the 7/12/24 encounter (Appointment) with Juno

## 2024-07-22 DIAGNOSIS — K21.9 GASTROESOPHAGEAL REFLUX DISEASE WITHOUT ESOPHAGITIS: ICD-10-CM

## 2024-07-22 DIAGNOSIS — I10 ESSENTIAL HYPERTENSION: ICD-10-CM

## 2024-07-22 RX ORDER — ESOMEPRAZOLE MAGNESIUM 40 MG/1
40 CAPSULE, DELAYED RELEASE ORAL
Qty: 7 CAPSULE | Refills: 0 | Status: SHIPPED | OUTPATIENT
Start: 2024-07-22 | End: 2025-04-18

## 2024-07-22 RX ORDER — ATENOLOL 25 MG/1
25 TABLET ORAL DAILY
Qty: 7 TABLET | Refills: 0 | Status: SHIPPED | OUTPATIENT
Start: 2024-07-22

## 2024-07-22 RX ORDER — LOSARTAN POTASSIUM AND HYDROCHLOROTHIAZIDE 25; 100 MG/1; MG/1
1 TABLET ORAL DAILY
Qty: 7 TABLET | Refills: 0 | Status: SHIPPED | OUTPATIENT
Start: 2024-07-22

## 2024-07-22 NOTE — TELEPHONE ENCOUNTER
Patient is in [place] flight cancelled and does not have luggage, needs medication sent to [facility] [place]

## 2024-07-30 ENCOUNTER — OFFICE VISIT (OUTPATIENT)
Dept: FAMILY MEDICINE CLINIC | Age: 69
End: 2024-07-30
Payer: MEDICARE

## 2024-07-30 VITALS
RESPIRATION RATE: 18 BRPM | HEIGHT: 62 IN | DIASTOLIC BLOOD PRESSURE: 78 MMHG | HEART RATE: 72 BPM | OXYGEN SATURATION: 94 % | WEIGHT: 151 LBS | BODY MASS INDEX: 27.79 KG/M2 | SYSTOLIC BLOOD PRESSURE: 130 MMHG

## 2024-07-30 VITALS
WEIGHT: 151 LBS | TEMPERATURE: 98 F | SYSTOLIC BLOOD PRESSURE: 130 MMHG | HEART RATE: 78 BPM | BODY MASS INDEX: 27.62 KG/M2 | OXYGEN SATURATION: 94 % | DIASTOLIC BLOOD PRESSURE: 78 MMHG

## 2024-07-30 DIAGNOSIS — K75.81 NASH (NONALCOHOLIC STEATOHEPATITIS): ICD-10-CM

## 2024-07-30 DIAGNOSIS — I10 ESSENTIAL HYPERTENSION: Primary | ICD-10-CM

## 2024-07-30 DIAGNOSIS — R94.6 ABNORMAL RESULTS OF THYROID FUNCTION STUDIES: ICD-10-CM

## 2024-07-30 DIAGNOSIS — Z00.00 MEDICARE ANNUAL WELLNESS VISIT, SUBSEQUENT: Primary | ICD-10-CM

## 2024-07-30 DIAGNOSIS — E78.00 PURE HYPERCHOLESTEROLEMIA: ICD-10-CM

## 2024-07-30 PROBLEM — R79.89 ELEVATED TSH: Status: RESOLVED | Noted: 2024-01-03 | Resolved: 2024-07-30

## 2024-07-30 PROBLEM — R74.8 ELEVATED LIVER ENZYMES: Status: RESOLVED | Noted: 2020-10-07 | Resolved: 2024-07-30

## 2024-07-30 LAB
ALBUMIN SERPL-MCNC: 4.6 G/DL (ref 3.4–5)
ALP SERPL-CCNC: 107 U/L (ref 40–129)
ALT SERPL-CCNC: 24 U/L (ref 10–40)
ANION GAP SERPL CALCULATED.3IONS-SCNC: 10 MMOL/L (ref 3–16)
AST SERPL-CCNC: 24 U/L (ref 15–37)
BILIRUB DIRECT SERPL-MCNC: <0.2 MG/DL (ref 0–0.3)
BILIRUB INDIRECT SERPL-MCNC: NORMAL MG/DL (ref 0–1)
BILIRUB SERPL-MCNC: 0.9 MG/DL (ref 0–1)
BUN SERPL-MCNC: 11 MG/DL (ref 7–20)
CALCIUM SERPL-MCNC: 10.2 MG/DL (ref 8.3–10.6)
CHLORIDE SERPL-SCNC: 100 MMOL/L (ref 99–110)
CHOLEST SERPL-MCNC: 216 MG/DL (ref 0–199)
CO2 SERPL-SCNC: 27 MMOL/L (ref 21–32)
CREAT SERPL-MCNC: 0.6 MG/DL (ref 0.6–1.2)
DEPRECATED RDW RBC AUTO: 14.2 % (ref 12.4–15.4)
GFR SERPLBLD CREATININE-BSD FMLA CKD-EPI: >90 ML/MIN/{1.73_M2}
GLUCOSE SERPL-MCNC: 96 MG/DL (ref 70–99)
HCT VFR BLD AUTO: 41.5 % (ref 36–48)
HDLC SERPL-MCNC: 49 MG/DL (ref 40–60)
HGB BLD-MCNC: 14.3 G/DL (ref 12–16)
LDLC SERPL CALC-MCNC: 139 MG/DL
MCH RBC QN AUTO: 30.1 PG (ref 26–34)
MCHC RBC AUTO-ENTMCNC: 34.4 G/DL (ref 31–36)
MCV RBC AUTO: 87.5 FL (ref 80–100)
PLATELET # BLD AUTO: 199 K/UL (ref 135–450)
PMV BLD AUTO: 9.5 FL (ref 5–10.5)
POTASSIUM SERPL-SCNC: 4.2 MMOL/L (ref 3.5–5.1)
PROT SERPL-MCNC: 7.3 G/DL (ref 6.4–8.2)
RBC # BLD AUTO: 4.75 M/UL (ref 4–5.2)
SODIUM SERPL-SCNC: 137 MMOL/L (ref 136–145)
TRIGL SERPL-MCNC: 142 MG/DL (ref 0–150)
TSH SERPL DL<=0.005 MIU/L-ACNC: 2.46 UIU/ML (ref 0.27–4.2)
VLDLC SERPL CALC-MCNC: 28 MG/DL
WBC # BLD AUTO: 5.5 K/UL (ref 4–11)

## 2024-07-30 PROCEDURE — 1123F ACP DISCUSS/DSCN MKR DOCD: CPT | Performed by: FAMILY MEDICINE

## 2024-07-30 PROCEDURE — 3078F DIAST BP <80 MM HG: CPT | Performed by: FAMILY MEDICINE

## 2024-07-30 PROCEDURE — 99214 OFFICE O/P EST MOD 30 MIN: CPT | Performed by: FAMILY MEDICINE

## 2024-07-30 PROCEDURE — G0439 PPPS, SUBSEQ VISIT: HCPCS | Performed by: FAMILY MEDICINE

## 2024-07-30 PROCEDURE — 3075F SYST BP GE 130 - 139MM HG: CPT | Performed by: FAMILY MEDICINE

## 2024-07-30 PROCEDURE — G2211 COMPLEX E/M VISIT ADD ON: HCPCS | Performed by: FAMILY MEDICINE

## 2024-07-30 RX ORDER — VITAMIN E 268 MG
400 CAPSULE ORAL DAILY
COMMUNITY

## 2024-07-30 ASSESSMENT — PATIENT HEALTH QUESTIONNAIRE - PHQ9
SUM OF ALL RESPONSES TO PHQ QUESTIONS 1-9: 0
1. LITTLE INTEREST OR PLEASURE IN DOING THINGS: NOT AT ALL
SUM OF ALL RESPONSES TO PHQ QUESTIONS 1-9: 0
2. FEELING DOWN, DEPRESSED OR HOPELESS: NOT AT ALL
SUM OF ALL RESPONSES TO PHQ9 QUESTIONS 1 & 2: 0

## 2024-07-30 ASSESSMENT — LIFESTYLE VARIABLES
HOW MANY STANDARD DRINKS CONTAINING ALCOHOL DO YOU HAVE ON A TYPICAL DAY: PATIENT DOES NOT DRINK
HOW OFTEN DO YOU HAVE A DRINK CONTAINING ALCOHOL: NEVER

## 2024-07-30 NOTE — PATIENT INSTRUCTIONS
Please read the healthy family handout that you were given and share it with your family.       Please compare this printed medication list with your medications at home to be sure they are the same.  If you have any medications that are different please contact us immediately at [phone].     Also review your allergies that we have listed, these may also include medications that you have not been able to tolerate, make sure everything listed is correct. If you have any allergies that are different please contact us immediately at [phone].     You may receive a survey in the mail or by email asking about your experience during your visit today. Please complete and return to us so we know how we are serving you.

## 2024-07-30 NOTE — PROGRESS NOTES
Lymphadenopathy:      Cervical: No cervical adenopathy.      Upper Body:      Right upper body: No supraclavicular adenopathy.   Skin:     General: Skin is warm.   Neurological:      Mental Status: She is alert and oriented to person, place, and time.   Psychiatric:         Behavior: Behavior normal.         Thought Content: Thought content normal.         Judgment: Judgment normal.       Assessment and Plan:   Diagnosis Orders   1. Essential hypertension  Basic Metabolic Panel      2. Pure hypercholesterolemia  Lipid Panel      3. Abnormal results of thyroid function studies  TSH with Reflex      4. BAILEY (nonalcoholic steatohepatitis)  CBC    Hepatic Function Panel      Hypertension controlled on home numbers.  Check status of lipids today with blood work and recheck her thyroid which had abnormal findings in the past on blood work and recheck liver labs for her BAILEY.  Continue low-fat diet.  If she has any further syncopal event she will let me know.  Stay on vitamin E for BAILEY.  The problems listed in the assessment are stable unless otherwise indicated.  Prescription drug management completed today. She  was instructed to continue their current medications and treatment for the above problems unless otherwise indicated above.   Age-specific preventative medicine recommendations were reviewed with patient today.     Follow up in 6mo. Call or return to office for any problems that develop before the next scheduled follow-up appointment.     Chencho Dee M.D.    Parts of this note were completed using voice recognition transcription.  Every effort was made to ensure accuracy; however, inadvertent transcription errors may be present.

## 2024-07-30 NOTE — PROGRESS NOTES
Medicare Annual Wellness Visit    Masha Forrester is here for Medicare AWV    Assessment & Plan   Medicare annual wellness visit, subsequent  Recommendations for Preventive Services Due: see orders and patient instructions/AVS.  Recommended screening schedule for the next 5-10 years is provided to the patient in written form: see Patient Instructions/AVS.     Return in 1 year (on 7/30/2025) for Medicare AWV.     Subjective       Patient's complete Health Risk Assessment and screening values have been reviewed and are found in Flowsheets. The following problems were reviewed today and where indicated follow up appointments were made and/or referrals ordered.    Positive Risk Factor Screenings with Interventions:               General HRA Questions:  Select all that apply: (!) New or Increased Pain  Interventions - Pain:  See AVS for additional education material          Hearing Screen:  Do you or your family notice any trouble with your hearing that hasn't been managed with hearing aids?: (!) Yes (advised patient to have hearing checked)    Interventions:  See AVS for additional education material                     Objective   Vitals:    07/30/24 1005   BP: 130/78   Pulse: 72   Resp: 18   SpO2: 94%   Weight: 68.5 kg (151 lb)   Height: 1.575 m (5' 2\")      Body mass index is 27.62 kg/m².                    Allergies   Allergen Reactions    Pseudoephedrine Hcl     Sudafed [Pseudoephedrine Hcl]      Prior to Visit Medications    Medication Sig Taking? Authorizing Provider   vitamin D (CHOLECALCIFEROL) 125 MCG (5000 UT) CAPS capsule Take 1 capsule by mouth daily  Provider, MD Riley   atenolol (TENORMIN) 25 MG tablet Take 1 tablet by mouth daily  Daniela Fraire APRN - CNP   losartan-hydroCHLOROthiazide (HYZAAR) 100-25 MG per tablet Take 1 tablet by mouth daily  Daniela Fraire APRN - CNP   esomeprazole (NEXIUM) 40 MG delayed release capsule Take 1 capsule by mouth every morning (before breakfast)

## 2024-10-21 ENCOUNTER — TELEPHONE (OUTPATIENT)
Dept: PHARMACY | Facility: CLINIC | Age: 69
End: 2024-10-21

## 2024-10-21 DIAGNOSIS — I10 ESSENTIAL HYPERTENSION: ICD-10-CM

## 2024-10-21 RX ORDER — LOSARTAN POTASSIUM AND HYDROCHLOROTHIAZIDE 25; 100 MG/1; MG/1
1 TABLET ORAL DAILY
Qty: 7 TABLET | Refills: 0 | Status: SHIPPED | OUTPATIENT
Start: 2024-10-21

## 2024-10-21 NOTE — TELEPHONE ENCOUNTER
[facility] CLINICAL PHARMACY: ADHERENCE REVIEW  Identified care gap per [employer]: fills at [facility]: ACE/ARB adherence      ASSESSMENT    ACE/ARB ADHERENCE    Insurance Records claims through 10/4/24 (Prior Year PDC = 79% - FAILED; YTD PDC = 81%; Potential Fail Date: 10.25.24):   LOSARTAN/HCT -25 last filled on 24 for 90 day supply. Next refill due: 24    Prescribed si tablet/capsule daily    Per Insurer Portal: last filled on same       BP Readings from Last 3 Encounters:   24 130/78   24 130/78   24 (!) 144/82     Estimated Creatinine Clearance: 83 mL/min (based on SCr of 0.6 mg/dL).  Lab Results   Component Value Date    CREATININE 0.6 2024     Lab Results   Component Value Date    K 4.2 2024           The following are interventions that have been identified:   Patient OVERDUE refilling Losartan/HCTZ and active on home medication list.     Reached patient to review. She stated she still has a few pills. Will pend 90 -day supply request to prescriber. It looks like when prescriber called a 7 day vacation supply in the original Rx was cancelled. Provider office is pending for new rx.      Last Visit: 24  Next Visit: 25        Carey Varela CPhT  [facility] Clinical   [facility]  Toll Free: [phone] Option 1    For Pharmacy Admin Tracking Only    Program: "[employer]  CPA in place:  No  Recommendation Provided To: Provider: 1 via Called provider office and Patient/Caregiver: 1 via Telephone  Intervention Detail: Adherence Monitorin and Refill(s) Provided  Intervention Accepted By: Provider: 1  Gap Closed?: Yes   Time Spent (min): 15

## 2024-10-28 ENCOUNTER — OFFICE VISIT (OUTPATIENT)
Dept: FAMILY MEDICINE CLINIC | Age: 69
End: 2024-10-28

## 2024-10-28 VITALS
DIASTOLIC BLOOD PRESSURE: 82 MMHG | TEMPERATURE: 97.8 F | BODY MASS INDEX: 28.13 KG/M2 | SYSTOLIC BLOOD PRESSURE: 144 MMHG | WEIGHT: 153.8 LBS | OXYGEN SATURATION: 94 % | HEART RATE: 82 BPM

## 2024-10-28 DIAGNOSIS — J32.9 BACTERIAL SINUSITIS: Primary | ICD-10-CM

## 2024-10-28 DIAGNOSIS — B96.89 BACTERIAL SINUSITIS: Primary | ICD-10-CM

## 2024-10-28 DIAGNOSIS — J02.9 PHARYNGITIS, UNSPECIFIED ETIOLOGY: ICD-10-CM

## 2024-10-28 LAB — S PYO AG THROAT QL: NORMAL

## 2024-10-28 ASSESSMENT — ENCOUNTER SYMPTOMS
BLOOD IN STOOL: 0
WHEEZING: 1
SHORTNESS OF BREATH: 0
RHINORRHEA: 0
COUGH: 1
EYE DISCHARGE: 0
TROUBLE SWALLOWING: 0
ABDOMINAL PAIN: 0
SINUS PRESSURE: 1
NAUSEA: 0
SINUS PAIN: 1
VOICE CHANGE: 1
VOMITING: 0
SORE THROAT: 1
ALLERGIC/IMMUNOLOGIC NEGATIVE: 1

## 2024-10-28 NOTE — PROGRESS NOTES
[facility]  10/28/2024    Masha Forrester (:  1955) is a 69 y.o. female, here for evaluation of the following medical concerns:    Chief Complaint   Patient presents with    Pharyngitis    Cough    Fatigue        ASSESSMENT/ PLAN  1. Bacterial sinusitis  Patient presents today with a 1 week history of sore throat, productive cough, weakness, fatigue, wheezing and headaches.  No known fever however reports 1 episode of sweating at night.  Has been taking Mucinex over-the-counter which has improved her phlegm consistency however color is still yellow.  No known sick contacts.  Strep, COVID and flu negative in office today.  Physical exam reveals significant erythematous pharynx, frontal sinus tenderness, and enlarged salivary glands.  Will send off for throat culture as well as treat with antibiotics as below.  Patient instructed to follow-up in office if symptoms worsen or do not improve.  Also encouraged salt water gargles, discarding of toothbrush in 48 hours and increasing water intake.  Patient verbalized understanding and agreeable plan.  - amoxicillin-clavulanate (AUGMENTIN) 875-125 MG per tablet; Take 1 tablet by mouth 2 times daily for 7 days  Dispense: 14 tablet; Refill: 0  - POCT rapid strep A    2. Pharyngitis, unspecified etiology  See above  - Culture, Throat  - POCT rapid strep A       Return if symptoms worsen or fail to improve.    HPI  Patient presents today with a 1 week history of sore throat, productive cough, weakness, fatigue, wheezing and headaches.  Reports no known fevers however has had occasional sweats at night.  Has been taking Mucinex over-the-counter and phlegm has become thinner however is still yellow in color.  No known sick contacts.  Reports headaches are in the frontal sinus region bilaterally.    ROS  Review of Systems   Constitutional:  Positive for diaphoresis. Negative for activity change, fatigue and fever.   HENT:  Positive for congestion, sinus

## 2024-10-30 ENCOUNTER — TELEPHONE (OUTPATIENT)
Dept: FAMILY MEDICINE CLINIC | Age: 69
End: 2024-10-30

## 2024-10-30 DIAGNOSIS — R11.0 NAUSEA: Primary | ICD-10-CM

## 2024-10-30 LAB — BACTERIA THROAT AEROBE CULT: NORMAL

## 2024-10-30 RX ORDER — ONDANSETRON 4 MG/1
4 TABLET, FILM COATED ORAL 3 TIMES DAILY PRN
Qty: 30 TABLET | Refills: 0 | Status: SHIPPED | OUTPATIENT
Start: 2024-10-30

## 2024-10-30 NOTE — TELEPHONE ENCOUNTER
Symptoms may be related to the antibiotic. Is patient taking medication with food? I can provide nausea medication.

## 2024-10-30 NOTE — TELEPHONE ENCOUNTER
Pt called and states she was seen by Delfina on Monday 10/28/2024 Seen on Monday 10/28/2024 for her sore throat and fatigue. Pt given Augmentin and told to take mucinex with it. Pt states her throat is better, however now having nausea  cannot eat and has diarrhea. Not taken any otc meds. Please advise. Thank you

## 2024-10-30 NOTE — TELEPHONE ENCOUNTER
Patient has been taking medication with food, is agreeable to nausea medication, please send to [facility].

## 2024-11-06 ENCOUNTER — TELEPHONE (OUTPATIENT)
Dept: FAMILY MEDICINE CLINIC | Age: 69
End: 2024-11-06

## 2024-11-06 DIAGNOSIS — R93.89 ABNORMAL CXR: Primary | ICD-10-CM

## 2024-11-06 NOTE — TELEPHONE ENCOUNTER
Left message for patient to call back, per  received the xray from urgent care dated 10/31/24 and he suggest she have a repeat cxr in 2 weeks, order for xray placed.

## 2024-11-14 ENCOUNTER — TELEPHONE (OUTPATIENT)
Dept: FAMILY MEDICINE CLINIC | Age: 69
End: 2024-11-14

## 2024-11-14 NOTE — TELEPHONE ENCOUNTER
Patient seen on 10/28 with sinus inf, she was prescribed augmentin. She was seen at urgent care later that week with pneumonia and was given a zpak.  She finished taking both abx and states she does feel better but for the past 3 to 4 days she has developed tremors in her hands that is pretty much constant.  Spouse wanted to know if you thought this could be side effects from the abx?  She did go to [facility] today and had the chest xray repeated.

## 2024-11-15 NOTE — TELEPHONE ENCOUNTER
Pt informed.  She states that tremor is a lot better this am 40% better).   [facility] is where she had her XR done.  Called over for results.

## 2024-11-18 DIAGNOSIS — R93.89 ABNORMAL CXR: ICD-10-CM

## 2024-12-13 DIAGNOSIS — K21.9 GASTROESOPHAGEAL REFLUX DISEASE WITHOUT ESOPHAGITIS: ICD-10-CM

## 2024-12-13 RX ORDER — ESOMEPRAZOLE MAGNESIUM 40 MG/1
40 CAPSULE, DELAYED RELEASE ORAL
Qty: 90 CAPSULE | Refills: 2 | Status: SHIPPED | OUTPATIENT
Start: 2024-12-13 | End: 2025-09-09

## 2025-02-04 ENCOUNTER — TELEPHONE (OUTPATIENT)
Dept: FAMILY MEDICINE CLINIC | Age: 70
End: 2025-02-04

## 2025-02-25 ENCOUNTER — OFFICE VISIT (OUTPATIENT)
Dept: FAMILY MEDICINE CLINIC | Age: 70
End: 2025-02-25
Payer: MEDICARE

## 2025-02-25 VITALS
TEMPERATURE: 98.2 F | WEIGHT: 156 LBS | DIASTOLIC BLOOD PRESSURE: 85 MMHG | OXYGEN SATURATION: 95 % | HEART RATE: 66 BPM | SYSTOLIC BLOOD PRESSURE: 137 MMHG | BODY MASS INDEX: 28.53 KG/M2

## 2025-02-25 DIAGNOSIS — K75.81 NASH (NONALCOHOLIC STEATOHEPATITIS): ICD-10-CM

## 2025-02-25 DIAGNOSIS — E78.00 PURE HYPERCHOLESTEROLEMIA: ICD-10-CM

## 2025-02-25 DIAGNOSIS — R94.6 ABNORMAL RESULTS OF THYROID FUNCTION STUDIES: ICD-10-CM

## 2025-02-25 DIAGNOSIS — K21.9 GASTROESOPHAGEAL REFLUX DISEASE WITHOUT ESOPHAGITIS: ICD-10-CM

## 2025-02-25 DIAGNOSIS — I10 ESSENTIAL HYPERTENSION: Primary | ICD-10-CM

## 2025-02-25 PROCEDURE — 1159F MED LIST DOCD IN RCRD: CPT | Performed by: FAMILY MEDICINE

## 2025-02-25 PROCEDURE — 36415 COLL VENOUS BLD VENIPUNCTURE: CPT | Performed by: FAMILY MEDICINE

## 2025-02-25 PROCEDURE — 3079F DIAST BP 80-89 MM HG: CPT | Performed by: FAMILY MEDICINE

## 2025-02-25 PROCEDURE — 99214 OFFICE O/P EST MOD 30 MIN: CPT | Performed by: FAMILY MEDICINE

## 2025-02-25 PROCEDURE — 1160F RVW MEDS BY RX/DR IN RCRD: CPT | Performed by: FAMILY MEDICINE

## 2025-02-25 PROCEDURE — 3075F SYST BP GE 130 - 139MM HG: CPT | Performed by: FAMILY MEDICINE

## 2025-02-25 PROCEDURE — G2211 COMPLEX E/M VISIT ADD ON: HCPCS | Performed by: FAMILY MEDICINE

## 2025-02-25 PROCEDURE — 1123F ACP DISCUSS/DSCN MKR DOCD: CPT | Performed by: FAMILY MEDICINE

## 2025-02-25 SDOH — ECONOMIC STABILITY: FOOD INSECURITY: WITHIN THE PAST 12 MONTHS, THE FOOD YOU BOUGHT JUST DIDN'T LAST AND YOU DIDN'T HAVE MONEY TO GET MORE.: NEVER TRUE

## 2025-02-25 SDOH — ECONOMIC STABILITY: FOOD INSECURITY: WITHIN THE PAST 12 MONTHS, YOU WORRIED THAT YOUR FOOD WOULD RUN OUT BEFORE YOU GOT MONEY TO BUY MORE.: NEVER TRUE

## 2025-02-25 ASSESSMENT — PATIENT HEALTH QUESTIONNAIRE - PHQ9
SUM OF ALL RESPONSES TO PHQ QUESTIONS 1-9: 0
SUM OF ALL RESPONSES TO PHQ9 QUESTIONS 1 & 2: 0
1. LITTLE INTEREST OR PLEASURE IN DOING THINGS: NOT AT ALL
SUM OF ALL RESPONSES TO PHQ QUESTIONS 1-9: 0
2. FEELING DOWN, DEPRESSED OR HOPELESS: NOT AT ALL

## 2025-02-25 NOTE — PATIENT INSTRUCTIONS
Please read the healthy family handout that you were given and share it with your family.       Please compare this printed medication list with your medications at home to be sure they are the same.  If you have any medications that are different please contact us immediately at [phone].     Also review your allergies that we have listed, these may also include medications that you have not been able to tolerate, make sure everything listed is correct. If you have any allergies that are different please contact us immediately at [phone].     You may receive a survey in the mail or by email asking about your experience during your visit today. Please complete and return to us so we know how we are serving you.

## 2025-02-25 NOTE — PROGRESS NOTES
She presents today for routine follow-up of hypertension, liver disease, high cholesterol and GERD and past history of elevated TSH.  Her repeat TSH back in July came back normal. She had a colonoscopy in Jan.  Overall she is doing well she did lose 25 pounds after she saw a liver doctor and she also recently had a normal colonoscopy by the liver doctor she states.  She no longer has pain over her liver since she lost weight.  Tests and documents reviewed today: Last labs     Objective:   /85   Pulse 66   Temp 98.2 °F (36.8 °C) (Oral)   Wt 70.8 kg (156 lb)   SpO2 95%   BMI 28.53 kg/m²   BP Readings from Last 3 Encounters:   02/25/25 137/85   10/28/24 (!) 144/82   07/30/24 130/78     Physical Exam  Vitals reviewed.   Constitutional:       Appearance: She is well-developed. She is not toxic-appearing.   HENT:      Head: Normocephalic.   Neck:      Thyroid: No thyroid mass or thyromegaly.   Cardiovascular:      Rate and Rhythm: Normal rate and regular rhythm.      Heart sounds: Normal heart sounds. No murmur heard.  Pulmonary:      Effort: No respiratory distress.      Breath sounds: Normal breath sounds. No wheezing or rales.   Abdominal:      General: There is no distension.      Palpations: Abdomen is soft. There is no mass.      Tenderness: There is no abdominal tenderness. There is no guarding or rebound.   Musculoskeletal:      Cervical back: Neck supple.   Lymphadenopathy:      Cervical: No cervical adenopathy.      Upper Body:      Right upper body: No supraclavicular adenopathy.   Skin:     General: Skin is warm.   Neurological:      Mental Status: She is alert and oriented to person, place, and time.   Psychiatric:         Behavior: Behavior normal.         Thought Content: Thought content normal.         Judgment: Judgment normal.       Assessment and Plan:   Diagnosis Orders   1. Essential hypertension  Basic Metabolic Panel      2. BAILEY (nonalcoholic steatohepatitis)  CBC    Hepatic Function

## 2025-02-26 LAB
ALBUMIN SERPL-MCNC: 4.6 G/DL (ref 3.4–5)
ALP SERPL-CCNC: 92 U/L (ref 40–129)
ALT SERPL-CCNC: 32 U/L (ref 10–40)
ANION GAP SERPL CALCULATED.3IONS-SCNC: 9 MMOL/L (ref 3–16)
AST SERPL-CCNC: 29 U/L (ref 15–37)
BILIRUB DIRECT SERPL-MCNC: 0.3 MG/DL (ref 0–0.3)
BILIRUB INDIRECT SERPL-MCNC: 0.6 MG/DL (ref 0–1)
BILIRUB SERPL-MCNC: 0.9 MG/DL (ref 0–1)
BUN SERPL-MCNC: 12 MG/DL (ref 7–20)
CALCIUM SERPL-MCNC: 10 MG/DL (ref 8.3–10.6)
CHLORIDE SERPL-SCNC: 98 MMOL/L (ref 99–110)
CHOLEST SERPL-MCNC: 224 MG/DL (ref 0–199)
CO2 SERPL-SCNC: 28 MMOL/L (ref 21–32)
CREAT SERPL-MCNC: 0.8 MG/DL (ref 0.6–1.2)
DEPRECATED RDW RBC AUTO: 13.4 % (ref 12.4–15.4)
GFR SERPLBLD CREATININE-BSD FMLA CKD-EPI: 80 ML/MIN/{1.73_M2}
GLUCOSE SERPL-MCNC: 96 MG/DL (ref 70–99)
HCT VFR BLD AUTO: 43.4 % (ref 36–48)
HDLC SERPL-MCNC: 46 MG/DL (ref 40–60)
HGB BLD-MCNC: 14.6 G/DL (ref 12–16)
LDLC SERPL CALC-MCNC: 140 MG/DL
MCH RBC QN AUTO: 30.1 PG (ref 26–34)
MCHC RBC AUTO-ENTMCNC: 33.7 G/DL (ref 31–36)
MCV RBC AUTO: 89.3 FL (ref 80–100)
PLATELET # BLD AUTO: 195 K/UL (ref 135–450)
PMV BLD AUTO: 9.6 FL (ref 5–10.5)
POTASSIUM SERPL-SCNC: 3.9 MMOL/L (ref 3.5–5.1)
PROT SERPL-MCNC: 7.2 G/DL (ref 6.4–8.2)
RBC # BLD AUTO: 4.86 M/UL (ref 4–5.2)
SODIUM SERPL-SCNC: 135 MMOL/L (ref 136–145)
TRIGL SERPL-MCNC: 192 MG/DL (ref 0–150)
TSH SERPL DL<=0.005 MIU/L-ACNC: 2.26 UIU/ML (ref 0.27–4.2)
VLDLC SERPL CALC-MCNC: 38 MG/DL
WBC # BLD AUTO: 6.5 K/UL (ref 4–11)

## 2025-05-13 ENCOUNTER — OFFICE VISIT (OUTPATIENT)
Dept: FAMILY MEDICINE CLINIC | Age: 70
End: 2025-05-13

## 2025-05-13 VITALS
WEIGHT: 157 LBS | BODY MASS INDEX: 28.89 KG/M2 | OXYGEN SATURATION: 98 % | HEIGHT: 62 IN | SYSTOLIC BLOOD PRESSURE: 117 MMHG | HEART RATE: 73 BPM | DIASTOLIC BLOOD PRESSURE: 80 MMHG

## 2025-05-13 DIAGNOSIS — I10 ESSENTIAL HYPERTENSION: Primary | ICD-10-CM

## 2025-05-13 DIAGNOSIS — Z82.49 FAMILY HISTORY OF HEART DISEASE IN MALE FAMILY MEMBER BEFORE AGE 55: ICD-10-CM

## 2025-05-13 RX ORDER — ATENOLOL 50 MG/1
50 TABLET ORAL DAILY
Qty: 90 TABLET | Refills: 3 | Status: SHIPPED | OUTPATIENT
Start: 2025-05-13 | End: 2026-05-08

## 2025-05-13 RX ORDER — LOSARTAN POTASSIUM AND HYDROCHLOROTHIAZIDE 25; 100 MG/1; MG/1
1 TABLET ORAL DAILY
Qty: 90 TABLET | Refills: 3 | Status: SHIPPED | OUTPATIENT
Start: 2025-05-13

## 2025-05-13 NOTE — PROGRESS NOTES
Assessment & Plan    Assessment & Plan  1. Hypertension  - Blood pressure remains inadequately controlled, with home readings typically around 145/85 mmHg  - Current regimen includes losartan and hydrochlorothiazide in the morning, and atenolol at night  - Discussed the possibility of increasing atenolol dosage and utilizing phone alarms for medication reminders  - Referral to a cardiologist recommended due to strong family history of early heart disease  - Atenolol dosage increased to 50 mg with a 90-day supply and refills  - Referral to Dr. Thompson, a cardiologist at [facility], arranged    2. Headache  - Headache may be attributed to dehydration or environmental allergies  - Advised to increase water intake to approximately 64 ounces daily  - Suggested the use of Tylenol for headache management      Essential hypertension  -     losartan-hydroCHLOROthiazide (HYZAAR) 100-25 MG per tablet; Take 1 tablet by mouth daily, Disp-90 tablet, R-3Normal  -     atenolol (TENORMIN) 50 MG tablet; Take 1 tablet by mouth daily, Disp-90 tablet, R-3Normal  -     Non BS - External Referral To Cardiology  Family history of heart disease in male family member before age 55  -     Non BS - External Referral To Cardiology         Subjective   History of Present Illness  The patient is a 69-year-old white female, patient of Dr. Dee, who presents today to follow up on hypertension. She was seen in the ED a few days ago due to elevated blood pressure.  Patient reports she has been monitoring her blood pressure and it has fluctuated.  Patient reports average blood pressure in the 140s over the mid 80s.        Objective   Physical Exam  General Appearance: Normal.  Vital signs: Within normal limits.  HEENT: Within normal limits.  Respiratory: Clear to auscultation, no wheezing, rales or rhonchi.  Skin: Warm and dry, no rash.  Neurological: Normal.     Vitals:    05/13/25 1159   BP: 117/80   BP Site: Left Upper Arm   Patient

## 2025-05-28 ENCOUNTER — TELEPHONE (OUTPATIENT)
Dept: FAMILY MEDICINE CLINIC | Age: 70
End: 2025-05-28

## 2025-06-13 DIAGNOSIS — K21.9 GASTROESOPHAGEAL REFLUX DISEASE WITHOUT ESOPHAGITIS: ICD-10-CM

## 2025-06-13 RX ORDER — ESOMEPRAZOLE MAGNESIUM 40 MG/1
40 CAPSULE, DELAYED RELEASE ORAL
Qty: 90 CAPSULE | Refills: 2 | Status: SHIPPED | OUTPATIENT
Start: 2025-06-13 | End: 2026-03-10

## 2025-06-13 NOTE — TELEPHONE ENCOUNTER
Future appt 9/16    Last appt 5/13  Pharmacy states they do not have refills left on this medication

## 2025-07-15 ENCOUNTER — TELEMEDICINE (OUTPATIENT)
Dept: FAMILY MEDICINE CLINIC | Age: 70
End: 2025-07-15

## 2025-07-15 DIAGNOSIS — Z00.00 MEDICARE ANNUAL WELLNESS VISIT, SUBSEQUENT: Primary | ICD-10-CM

## 2025-07-15 RX ORDER — AMLODIPINE BESYLATE 5 MG/1
5 TABLET ORAL DAILY
COMMUNITY
Start: 2025-06-17

## 2025-07-15 ASSESSMENT — PATIENT HEALTH QUESTIONNAIRE - PHQ9
2. FEELING DOWN, DEPRESSED OR HOPELESS: NOT AT ALL
SUM OF ALL RESPONSES TO PHQ QUESTIONS 1-9: 0
1. LITTLE INTEREST OR PLEASURE IN DOING THINGS: NOT AT ALL
SUM OF ALL RESPONSES TO PHQ QUESTIONS 1-9: 0

## 2025-07-15 NOTE — PROGRESS NOTES
Medicare Annual Wellness Visit    Masha Forrester is here for Medicare AWV    Assessment & Plan   Medicare annual wellness visit, subsequent     Return in 1 year (on 7/15/2026) for Medicare AWV.     Subjective   Patient's complete Health Risk Assessment and screening values have been reviewed and are found in Flowsheets. The following problems were reviewed today and where indicated follow up appointments were made and/or referrals ordered.    No Positive Risk Factors identified today.       Objective    Patient-Reported Vitals  Patient-Reported Weight: 157lbs   Patient does not monitor vitals at home             Allergies   Allergen Reactions    Sudafed [Pseudoephedrine Hcl]      Prior to Visit Medications    Medication Sig Taking? Authorizing Provider   amLODIPine (NORVASC) 5 MG tablet Take 1 tablet by mouth daily Yes Riley Decker MD   esomeprazole (NEXIUM) 40 MG delayed release capsule Take 1 capsule by mouth every morning (before breakfast) Yes Chencho Dee MD   losartan-hydroCHLOROthiazide (HYZAAR) 100-25 MG per tablet Take 1 tablet by mouth daily Yes Daniela Fraire APRN - CNP   atenolol (TENORMIN) 50 MG tablet Take 1 tablet by mouth daily Yes Daniela Fraire APRN - CNP   vitamin D (CHOLECALCIFEROL) 125 MCG (5000 UT) CAPS capsule Take 1 capsule by mouth daily Yes Riley Decker MD   vitamin E 400 UNIT capsule Take 1 capsule by mouth daily Indications: Fatty Liver with Inflammation not caused by Alcohol Use Yes Riley Decker MD   Multiple Vitamin (MULTIVITAMIN ADULT PO) Take by mouth Yes Provider, Historical, MD   MISC NATURAL PRODUCTS PO Take by mouth Nature sunshine marshmallow Yes Riley Decker MD LUMIGAN 0.01 % SOLN ophthalmic drops Use as directed. Apply to top eyelash area only with applicator 1 time daily Yes Chencho Dee MD MISC NATURAL PRODUCTS PO Take by mouth Thyroid activator Yes Riley Decker MD   LYSINE PO Take by mouth Yes

## 2025-07-15 NOTE — PATIENT INSTRUCTIONS
Personalized Preventive Plan for Masha Forrester - 7/15/2025  Medicare offers a range of preventive health benefits. Some of the tests and screenings are paid in full while other may be subject to a deductible, co-insurance, and/or copay.  Some of these benefits include a comprehensive review of your medical history including lifestyle, illnesses that may run in your family, and various assessments and screenings as appropriate.  After reviewing your medical record and screening and assessments performed today your provider may have ordered immunizations, labs, imaging, and/or referrals for you.  A list of these orders (if applicable) as well as your Preventive Care list are included within your After Visit Summary for your review.

## 2025-07-21 ENCOUNTER — CARE COORDINATION (OUTPATIENT)
Dept: CARE COORDINATION | Age: 70
End: 2025-07-21

## 2025-07-21 RX ORDER — MULTIVIT WITH MINERALS/LUTEIN
1000 TABLET ORAL DAILY
COMMUNITY

## 2025-07-21 RX ORDER — VITAMIN B COMPLEX
1 CAPSULE ORAL DAILY
COMMUNITY

## 2025-07-21 NOTE — CARE COORDINATION
Ambulatory Care Coordination Note     2025      Patient Current Location:  Home: [address] [place] [address]     This patient was received as a referral from [facility] health report .    ACM contacted the patient by telephone. Verified name and  with patient as identifiers. Provided introduction to self, and explanation of the ACM role.   Patient accepted care management services at this time.          ACM: Sandra Grimm RN     Challenges to be reviewed by the provider   Additional needs identified to be addressed with provider Yes  Patient requesting to d/c and or change blood pressure medication-She reports Novasc has caused swelling in her legs and ankles in the past and she does not like how the medication feels.                Method of communication with provider: chart routing.    Utilization: Patient has not had any utilization since our last call.     Care Summary Note:   Patient reports her cardiologist set BP goal for below 130/80    25:  144/82  25:  132/78  7/15/25:  129/87  25:  153/86  25:  126/79  25:    136/85  25:    125/81  25:    126/80    Patient not taking Norvasc consistently  Reviewed risks and explained potential rebound effects  Reviewed Clinical references AAA; patient has CT scheduled for 2025    Offered patient enrollment in the Remote Patient Monitoring (RPM) program for in-home monitoring: Yes, but did not enroll at this time: already monitoring with home equipment.     Assessments Completed:   General Assessment    Do you have any symptoms that are causing concern?: No          Medications Reviewed:   Completed during this call and   Current Outpatient Medications   Medication Sig Dispense Refill    Ascorbic Acid (VITAMIN C) 1000 MG tablet Take 1 tablet by mouth daily      b complex vitamins capsule Take 1 capsule by mouth daily      MILK THISTLE PO Take 350 mg by mouth daily      OLIVE LEAF PO Take 420 mg by mouth

## 2025-07-22 NOTE — TELEPHONE ENCOUNTER
Have discontinue amlodipine.  Stay on losartan HCTZ.  Change atenolol to 50 mg twice daily to better control blood pressure.

## 2025-07-22 NOTE — PROGRESS NOTES
Will discontinue amlodipine and have increase atenolol to 50 mg twice daily and stay on losartan HCTZ

## 2025-07-23 ENCOUNTER — TELEPHONE (OUTPATIENT)
Dept: FAMILY MEDICINE CLINIC | Age: 70
End: 2025-07-23

## 2025-07-23 DIAGNOSIS — I10 ESSENTIAL HYPERTENSION: ICD-10-CM

## 2025-07-23 NOTE — CARE COORDINATION
Ambulatory Care Coordination Note     7/23/2025      Patient outreach attempt by this ACM today to perform care management follow up . ACM was unable to reach the patient by telephone today;   left voice message requesting a return phone call to this ACM.     ACM: Sandra Grimm RN     Care Summary Note:   Attempted to contact patient to review PCP's new orders    PCP/Specialist follow up:   Future Appointments         Provider Specialty Dept Phone    9/16/2025 9:40 AM Chencho Dee MD Family Medicine [phone]    7/21/2026 10:00 AM SCHEDULE, [facility] AWV LPN Family Medicine [phone]

## 2025-07-24 RX ORDER — ATENOLOL 50 MG/1
50 TABLET ORAL 2 TIMES DAILY
Qty: 180 TABLET | Refills: 1
Start: 2025-07-24 | End: 2026-07-19

## 2025-07-24 NOTE — CARE COORDINATION
Noted PCP office notified patient regarding BP medication change  Patient left a message on this ACM's voice mail repeating back the medication change  This ACM will f/u next week as planned to review BP readings and complete CC assessment

## 2025-07-28 ENCOUNTER — CARE COORDINATION (OUTPATIENT)
Dept: CARE COORDINATION | Age: 70
End: 2025-07-28

## 2025-07-28 NOTE — CARE COORDINATION
Ambulatory Care Coordination Note     2025      Patient Current Location:  Home: [address] [place] [address]     ACM contacted the patient by telephone. Verified name and  with patient as identifiers.         ACM: Sandra Grimm RN     Challenges to be reviewed by the provider   Additional needs identified to be addressed with provider Yes  Patient c/o increased fatigue  since increasing dose of atenolol.     Patient monitoring BP daily  133/47  122/71  114/73  120/73           Method of communication with provider: chart routing.    Utilization: Patient has not had any utilization since our last call.     Care Summary Note:   Patient reports \"not feeling well\" since increasing dose of Atenolol  Patient c/o increased fatigue and occasional dizziness  Advised patient to check bp if feeling dizzy  Advised patient to notify PCP if SBP 90's    Offered patient enrollment in the Remote Patient Monitoring (RPM) program for in-home monitoring: Yes, but did not enroll at this time: already monitoring with home equipment and patient works on a large farm; she is very busy.     Assessments Completed:   General Assessment    Do you have any symptoms that are causing concern?: Yes  Reported Symptoms: Fatigue          Medications Reviewed:   Patient denies any changes with medications and reports taking all medications as prescribed. and reviewed recent increased dose of Atenolol    Advance Care Planning:   Not reviewed during this call     Care Planning:    Goals Addressed                   This Visit's Progress       Care Coordination     Self Monitoring        Blood Pressure - I will take my blood pressure as directed - Daily and Other: as needed     Barriers: none  Plan for overcoming my barriers: N/A  Confidence: 10/10  Anticipated Goal Completion Date: 25                 PCP/Specialist follow up:   Future Appointments         Provider Specialty Dept Phone    2025 9:40 AM Chencho Dee MD

## 2025-07-29 NOTE — CARE COORDINATION
Ambulatory Care Coordination Note     7/29/2025      Patient outreach attempt by this ACM today to perform care management follow up . ACM was unable to reach the patient by telephone today;   left voice message requesting a return phone call to this ACM.     ACM: Sandra Grimm RN     Care Summary Note:   Left message notifying patient her PCP did not change our plan of care  See note below-patient had agreed to continue atenolol and review blood pressures next week  This ACM left ACM contact number and instructions to contact this ACM or the office if she had any questions    PCP/Specialist follow up:   Future Appointments         Provider Specialty Dept Phone    9/16/2025 9:40 AM Chencho Dee MD Family Medicine [phone]    7/21/2026 10:00 AM SCHEDULE, [facility] AWV LPN Family Medicine [phone]

## 2025-08-06 ENCOUNTER — CARE COORDINATION (OUTPATIENT)
Dept: CARE COORDINATION | Age: 70
End: 2025-08-06

## 2025-08-11 ENCOUNTER — CARE COORDINATION (OUTPATIENT)
Dept: CARE COORDINATION | Age: 70
End: 2025-08-11

## 2025-08-19 ENCOUNTER — CARE COORDINATION (OUTPATIENT)
Dept: CARE COORDINATION | Age: 70
End: 2025-08-19

## 2025-08-19 DIAGNOSIS — I10 ESSENTIAL HYPERTENSION: ICD-10-CM

## 2025-08-19 DIAGNOSIS — R25.2 MUSCLE CRAMPING: Primary | ICD-10-CM

## 2025-08-19 RX ORDER — ATENOLOL 25 MG/1
25 TABLET ORAL EVERY MORNING
Qty: 30 TABLET | Refills: 3
Start: 2025-08-19

## 2025-08-19 RX ORDER — ATENOLOL 50 MG/1
50 TABLET ORAL NIGHTLY
Qty: 180 TABLET | Refills: 1 | Status: SHIPPED | OUTPATIENT
Start: 2025-08-19 | End: 2026-08-14

## 2025-08-20 ENCOUNTER — CLINICAL SUPPORT (OUTPATIENT)
Dept: FAMILY MEDICINE CLINIC | Age: 70
End: 2025-08-20
Payer: MEDICARE

## 2025-08-20 DIAGNOSIS — R25.2 MUSCLE CRAMPING: ICD-10-CM

## 2025-08-20 LAB
ANION GAP SERPL CALCULATED.3IONS-SCNC: 13 MMOL/L (ref 3–16)
BUN SERPL-MCNC: 14 MG/DL (ref 7–20)
CALCIUM SERPL-MCNC: 9.8 MG/DL (ref 8.3–10.6)
CHLORIDE SERPL-SCNC: 98 MMOL/L (ref 99–110)
CO2 SERPL-SCNC: 25 MMOL/L (ref 21–32)
CREAT SERPL-MCNC: 0.9 MG/DL (ref 0.6–1.2)
GFR SERPLBLD CREATININE-BSD FMLA CKD-EPI: 70 ML/MIN/{1.73_M2}
GLUCOSE SERPL-MCNC: 115 MG/DL (ref 70–99)
MAGNESIUM SERPL-MCNC: 2.13 MG/DL (ref 1.8–2.4)
POTASSIUM SERPL-SCNC: 4.2 MMOL/L (ref 3.5–5.1)
SODIUM SERPL-SCNC: 136 MMOL/L (ref 136–145)

## 2025-08-20 PROCEDURE — 36415 COLL VENOUS BLD VENIPUNCTURE: CPT | Performed by: FAMILY MEDICINE

## 2025-08-26 ENCOUNTER — TELEPHONE (OUTPATIENT)
Dept: PHARMACY | Facility: CLINIC | Age: 70
End: 2025-08-26

## 2025-08-26 RX ORDER — ATENOLOL 25 MG/1
25 TABLET ORAL EVERY MORNING
Qty: 30 TABLET | Refills: 3 | Status: CANCELLED | OUTPATIENT
Start: 2025-08-26

## 2025-09-02 ENCOUNTER — CARE COORDINATION (OUTPATIENT)
Dept: CARE COORDINATION | Age: 70
End: 2025-09-02